# Patient Record
Sex: FEMALE | Race: WHITE | NOT HISPANIC OR LATINO | URBAN - METROPOLITAN AREA
[De-identification: names, ages, dates, MRNs, and addresses within clinical notes are randomized per-mention and may not be internally consistent; named-entity substitution may affect disease eponyms.]

---

## 2017-03-15 ENCOUNTER — OFFICE (AMBULATORY)
Dept: URBAN - METROPOLITAN AREA CLINIC 75 | Facility: CLINIC | Age: 57
End: 2017-03-15
Payer: OTHER GOVERNMENT

## 2017-03-15 VITALS
HEART RATE: 68 BPM | SYSTOLIC BLOOD PRESSURE: 122 MMHG | WEIGHT: 181 LBS | DIASTOLIC BLOOD PRESSURE: 62 MMHG | HEIGHT: 62 IN

## 2017-03-15 DIAGNOSIS — R19.4 CHANGE IN BOWEL HABIT: ICD-10-CM

## 2017-03-15 DIAGNOSIS — R13.10 DYSPHAGIA, UNSPECIFIED: ICD-10-CM

## 2017-03-15 DIAGNOSIS — K21.9 GASTRO-ESOPHAGEAL REFLUX DISEASE WITHOUT ESOPHAGITIS: ICD-10-CM

## 2017-03-15 DIAGNOSIS — Z12.11 ENCOUNTER FOR SCREENING FOR MALIGNANT NEOPLASM OF COLON: ICD-10-CM

## 2017-03-15 DIAGNOSIS — K59.00 CONSTIPATION, UNSPECIFIED: ICD-10-CM

## 2017-03-15 PROCEDURE — 99205 OFFICE O/P NEW HI 60 MIN: CPT | Performed by: INTERNAL MEDICINE

## 2017-04-14 VITALS
DIASTOLIC BLOOD PRESSURE: 53 MMHG | HEART RATE: 72 BPM | HEART RATE: 64 BPM | SYSTOLIC BLOOD PRESSURE: 77 MMHG | TEMPERATURE: 98.1 F | HEART RATE: 70 BPM | RESPIRATION RATE: 4 BRPM | SYSTOLIC BLOOD PRESSURE: 95 MMHG | DIASTOLIC BLOOD PRESSURE: 45 MMHG | SYSTOLIC BLOOD PRESSURE: 101 MMHG | HEART RATE: 66 BPM | TEMPERATURE: 97.9 F | DIASTOLIC BLOOD PRESSURE: 57 MMHG | SYSTOLIC BLOOD PRESSURE: 116 MMHG | DIASTOLIC BLOOD PRESSURE: 32 MMHG | HEART RATE: 63 BPM | DIASTOLIC BLOOD PRESSURE: 60 MMHG | RESPIRATION RATE: 15 BRPM | HEART RATE: 68 BPM | DIASTOLIC BLOOD PRESSURE: 58 MMHG | WEIGHT: 181 LBS | HEART RATE: 62 BPM | OXYGEN SATURATION: 100 % | DIASTOLIC BLOOD PRESSURE: 61 MMHG | HEART RATE: 48 BPM | OXYGEN SATURATION: 95 % | OXYGEN SATURATION: 98 % | SYSTOLIC BLOOD PRESSURE: 108 MMHG | HEIGHT: 62 IN | OXYGEN SATURATION: 99 % | SYSTOLIC BLOOD PRESSURE: 78 MMHG | RESPIRATION RATE: 35 BRPM | DIASTOLIC BLOOD PRESSURE: 65 MMHG | SYSTOLIC BLOOD PRESSURE: 112 MMHG | SYSTOLIC BLOOD PRESSURE: 88 MMHG | RESPIRATION RATE: 16 BRPM | DIASTOLIC BLOOD PRESSURE: 55 MMHG | SYSTOLIC BLOOD PRESSURE: 83 MMHG

## 2017-04-17 ENCOUNTER — OFFICE (AMBULATORY)
Dept: URBAN - METROPOLITAN AREA CLINIC 64 | Facility: CLINIC | Age: 57
End: 2017-04-17
Payer: OTHER GOVERNMENT

## 2017-04-17 ENCOUNTER — AMBULATORY SURGICAL CENTER (AMBULATORY)
Dept: URBAN - METROPOLITAN AREA SURGERY 17 | Facility: SURGERY | Age: 57
End: 2017-04-17
Payer: OTHER GOVERNMENT

## 2017-04-17 DIAGNOSIS — K20.9 ESOPHAGITIS, UNSPECIFIED: ICD-10-CM

## 2017-04-17 DIAGNOSIS — R13.10 DYSPHAGIA, UNSPECIFIED: ICD-10-CM

## 2017-04-17 DIAGNOSIS — K31.9 DISEASE OF STOMACH AND DUODENUM, UNSPECIFIED: ICD-10-CM

## 2017-04-17 DIAGNOSIS — K21.0 GASTRO-ESOPHAGEAL REFLUX DISEASE WITH ESOPHAGITIS: ICD-10-CM

## 2017-04-17 LAB
GI HISTOLOGY: A. UNSPECIFIED: (no result)
GI HISTOLOGY: B. SELECT: (no result)
GI HISTOLOGY: C. SELECT: (no result)
GI HISTOLOGY: PDF REPORT: (no result)

## 2017-04-17 PROCEDURE — 43239 EGD BIOPSY SINGLE/MULTIPLE: CPT | Performed by: INTERNAL MEDICINE

## 2017-04-17 PROCEDURE — 88305 TISSUE EXAM BY PATHOLOGIST: CPT | Performed by: INTERNAL MEDICINE

## 2017-04-17 PROCEDURE — 43450 DILATE ESOPHAGUS 1/MULT PASS: CPT | Performed by: INTERNAL MEDICINE

## 2017-04-17 RX ADMIN — LIDOCAINE HYDROCHLORIDE 25 MG: 10 INJECTION, SOLUTION EPIDURAL; INFILTRATION; INTRACAUDAL; PERINEURAL at 13:02

## 2017-04-17 RX ADMIN — PROPOFOL 50 MG: 10 INJECTION, EMULSION INTRAVENOUS at 13:06

## 2017-04-17 RX ADMIN — PROPOFOL 50 MG: 10 INJECTION, EMULSION INTRAVENOUS at 13:04

## 2017-04-17 RX ADMIN — PROPOFOL 50 MG: 10 INJECTION, EMULSION INTRAVENOUS at 13:08

## 2017-04-17 RX ADMIN — PROPOFOL 100 MG: 10 INJECTION, EMULSION INTRAVENOUS at 13:02

## 2017-04-17 NOTE — SERVICEHPINOTES
Patient here for evaluation of dysphagia. She mentions difficulty swallowing now for the past 15 years. She has difficulty with liquids and solids. She has to perform several maneuvers to help facilitate food passing through her esophagus. She has to chew carefully, small bites. She has long-standing reflux disease. She takes twice-daily PPI therapy. She describes prior upper endoscopy 5 years ago at outside facility. She describes what sounds like esophageal dilation. Prior to this she describes EGD, colonoscopy. Colonoscopy was normal. This was approximately 5-10 years ago.BRShe denies any history of achalasia. No prior esophageal manometry study. There is no family history for colon cancer or other polyp problems. Patient denies any weight loss, rectal bleeding. She does have problems with constipation. Occasionally she will use magnesium citrate. She describes what sounds like prior bladder surgery and rectocele repair. Since that time she's had problems with some constipation.

## 2017-07-17 VITALS
WEIGHT: 175 LBS | DIASTOLIC BLOOD PRESSURE: 70 MMHG | HEART RATE: 64 BPM | HEIGHT: 62 IN | SYSTOLIC BLOOD PRESSURE: 102 MMHG

## 2017-07-18 ENCOUNTER — OFFICE (AMBULATORY)
Dept: URBAN - METROPOLITAN AREA CLINIC 75 | Facility: CLINIC | Age: 57
End: 2017-07-18
Payer: OTHER GOVERNMENT

## 2017-07-18 DIAGNOSIS — R13.10 DYSPHAGIA, UNSPECIFIED: ICD-10-CM

## 2017-07-18 DIAGNOSIS — K21.9 GASTRO-ESOPHAGEAL REFLUX DISEASE WITHOUT ESOPHAGITIS: ICD-10-CM

## 2017-07-18 PROCEDURE — 99213 OFFICE O/P EST LOW 20 MIN: CPT | Performed by: INTERNAL MEDICINE

## 2017-08-17 ENCOUNTER — OFFICE (AMBULATORY)
Dept: URBAN - METROPOLITAN AREA CLINIC 51 | Facility: CLINIC | Age: 57
End: 2017-08-17
Payer: OTHER GOVERNMENT

## 2017-08-17 DIAGNOSIS — K22.4 DYSKINESIA OF ESOPHAGUS: ICD-10-CM

## 2017-08-17 DIAGNOSIS — K21.9 GASTRO-ESOPHAGEAL REFLUX DISEASE WITHOUT ESOPHAGITIS: ICD-10-CM

## 2017-08-17 DIAGNOSIS — R13.10 DYSPHAGIA, UNSPECIFIED: ICD-10-CM

## 2017-08-17 PROCEDURE — 91037 ESOPH IMPED FUNCTION TEST: CPT | Mod: 59 | Performed by: INTERNAL MEDICINE

## 2017-08-17 PROCEDURE — 91010 ESOPHAGUS MOTILITY STUDY: CPT | Performed by: INTERNAL MEDICINE

## 2017-10-05 ENCOUNTER — HOSPITAL ENCOUNTER (OUTPATIENT)
Dept: OTHER | Facility: HOSPITAL | Age: 57
Discharge: HOME | End: 2017-10-05

## 2018-01-17 VITALS
DIASTOLIC BLOOD PRESSURE: 59 MMHG | OXYGEN SATURATION: 91 % | OXYGEN SATURATION: 87 % | HEART RATE: 69 BPM | TEMPERATURE: 98.2 F | DIASTOLIC BLOOD PRESSURE: 60 MMHG | SYSTOLIC BLOOD PRESSURE: 102 MMHG | OXYGEN SATURATION: 89 % | DIASTOLIC BLOOD PRESSURE: 76 MMHG | RESPIRATION RATE: 14 BRPM | DIASTOLIC BLOOD PRESSURE: 68 MMHG | DIASTOLIC BLOOD PRESSURE: 53 MMHG | DIASTOLIC BLOOD PRESSURE: 51 MMHG | RESPIRATION RATE: 15 BRPM | HEART RATE: 59 BPM | HEART RATE: 63 BPM | RESPIRATION RATE: 18 BRPM | HEART RATE: 68 BPM | OXYGEN SATURATION: 98 % | RESPIRATION RATE: 20 BRPM | RESPIRATION RATE: 22 BRPM | OXYGEN SATURATION: 93 % | HEART RATE: 70 BPM | TEMPERATURE: 97.2 F | SYSTOLIC BLOOD PRESSURE: 122 MMHG | HEART RATE: 56 BPM | RESPIRATION RATE: 21 BRPM | SYSTOLIC BLOOD PRESSURE: 101 MMHG | HEIGHT: 62 IN | RESPIRATION RATE: 17 BRPM | SYSTOLIC BLOOD PRESSURE: 126 MMHG | HEART RATE: 65 BPM | SYSTOLIC BLOOD PRESSURE: 118 MMHG | SYSTOLIC BLOOD PRESSURE: 104 MMHG | WEIGHT: 180 LBS | OXYGEN SATURATION: 97 % | OXYGEN SATURATION: 99 % | DIASTOLIC BLOOD PRESSURE: 57 MMHG

## 2018-01-19 ENCOUNTER — AMBULATORY SURGICAL CENTER (AMBULATORY)
Dept: URBAN - METROPOLITAN AREA SURGERY 17 | Facility: SURGERY | Age: 58
End: 2018-01-19
Payer: OTHER GOVERNMENT

## 2018-01-19 DIAGNOSIS — K64.0 FIRST DEGREE HEMORRHOIDS: ICD-10-CM

## 2018-01-19 DIAGNOSIS — Z12.11 ENCOUNTER FOR SCREENING FOR MALIGNANT NEOPLASM OF COLON: ICD-10-CM

## 2018-01-19 PROCEDURE — 45378 DIAGNOSTIC COLONOSCOPY: CPT | Mod: 33 | Performed by: INTERNAL MEDICINE

## 2018-01-19 RX ADMIN — PROPOFOL 25 MG: 10 INJECTION, EMULSION INTRAVENOUS at 14:38

## 2018-01-19 RX ADMIN — LIDOCAINE HYDROCHLORIDE 50 MG: 10 INJECTION, SOLUTION EPIDURAL; INFILTRATION; INTRACAUDAL; PERINEURAL at 14:25

## 2018-01-19 RX ADMIN — PROPOFOL 50 MG: 10 INJECTION, EMULSION INTRAVENOUS at 14:37

## 2018-01-19 RX ADMIN — PROPOFOL 50 MG: 10 INJECTION, EMULSION INTRAVENOUS at 14:29

## 2018-01-19 RX ADMIN — PROPOFOL 100 MG: 10 INJECTION, EMULSION INTRAVENOUS at 14:25

## 2018-01-19 NOTE — SERVICEHPINOTES
KATHRYN HANCOCK  presents for a "bypass" Colonoscopy for polyp surveillance.  She is aware of R/B/A as she has had prior scopes. Anesthesia provider has consented patient also.  Updated NP forms reviewed.

## 2019-03-26 ENCOUNTER — HOSPITAL ENCOUNTER (OUTPATIENT)
Dept: OTHER | Facility: HOSPITAL | Age: 59
Discharge: HOME | End: 2019-03-26

## 2019-07-29 ENCOUNTER — OFFICE VISIT CONVERTED (OUTPATIENT)
Dept: INTERNAL MEDICINE | Facility: CLINIC | Age: 59
End: 2019-07-29
Attending: INTERNAL MEDICINE

## 2019-10-29 ENCOUNTER — OFFICE VISIT CONVERTED (OUTPATIENT)
Dept: INTERNAL MEDICINE | Facility: CLINIC | Age: 59
End: 2019-10-29
Attending: INTERNAL MEDICINE

## 2020-01-13 ENCOUNTER — HOSPITAL ENCOUNTER (OUTPATIENT)
Dept: OTHER | Facility: HOSPITAL | Age: 60
Discharge: HOME | End: 2020-01-13
Attending: PHYSICIAN ASSISTANT

## 2020-01-13 ENCOUNTER — OFFICE VISIT CONVERTED (OUTPATIENT)
Dept: INTERNAL MEDICINE | Facility: CLINIC | Age: 60
End: 2020-01-13
Attending: PHYSICIAN ASSISTANT

## 2020-05-26 ENCOUNTER — TELEMEDICINE CONVERTED (OUTPATIENT)
Dept: INTERNAL MEDICINE | Facility: CLINIC | Age: 60
End: 2020-05-26
Attending: INTERNAL MEDICINE

## 2021-05-13 NOTE — PROGRESS NOTES
Progress Note      Patient Name: Cassia López   Patient ID: [id number]   Sex: Female   YOB: 1960    Primary Care Provider: Andrew Clemens MD    Visit Date: May 26, 2020    Provider: Andrew Clemens MD   Location: [facility]   Location Address: [address] [place] [address], [address]  [place], [place]  [id number]   Location Phone: [phone]          Chief Complaint  · annual exam      History Of Present Illness  Video Conferencing Visit  Cassia López is a 59 year old /White female who is presenting for evaluation via video conferencing via Zoom. Verbal consent obtained before beginning visit.   The following staff were present during this visit: Dr. Clemens and pt   Cassia López is a 59 year old /White female who presents for evaluation and treatment of:      HTN- pt denies HAs, dizziness, CP  hypothyroid- pt reports having blood work in 3/2020.   HLD- pt reports she forgot to take statin, which elevated her cholesterol.   SVT- pt denies palpitations.   depression- doing well on Cymbalta. pt denies HI and SI.   pap- has not had just yet  mammogram- pt is UTD this year       Past Medical History  Disease Name Date Onset Notes   Allergies --  --    Anemia --  --    Broken Bones --  --    Cataracts, bilateral --  --    Depression (emotion) --  --    GERD (gastroesophageal reflux disease) --  --    Head injury --  --    Hyperlipidemia --  --    Hypertension --  --    Hypothyroidism --  --    Night sweats --  --    Screening Mammogram 2019 --    SVT (supraventricular tachycardia) --  --          Past Surgical History  Procedure Name Date Notes   Bowel Surgery --  --    carpal tunnel --  --    Colonoscopy in adult 2017 --    Endoscopy 2017 --    Hernia --  --          Medication List  Name Date Started Instructions   Hector Thyroid 30 mg oral tablet 05/20/2020 take 1 tablet (30 mg) by oral route once daily for 90 days   aspirin 81 mg oral tablet,delayed release (/SHA) 05/20/2020  take 1 tablet (81 mg) by oral route once daily for 90 days   atenolol 25 mg oral tablet 05/20/2020 take 0.5 tablet by oral route 2 times a day for 90 days   Bi-Est 1.25 prog/200GSRCA Oral  Take 1 tablet once daily   Crestor 40 mg oral tablet  take 1 tablet once a daily   Cymbalta 60 mg oral capsule,delayed release(DR/EC) 05/20/2020 take 1 capsule (60 mg) by oral route once daily for 90 days   fluticasone propionate 50 mcg/actuation nasal spray,suspension 01/13/2020 spray 1 - 2 sprays (50 - 100 mcg) in each nostril by intranasal route once daily as needed   lidocaine 5 % topical adhesive patch,medicated 08/13/2019 apply 1 patch by transdermal route once daily (May wear up to 12hours.) PRN   Mobic 15 mg oral tablet 05/20/2020 take 1 tablet (15 mg) by oral route once daily for 90 days   Motrin  mg oral capsule 05/20/2020 take 4 capsules prn for migraines   Pataday 0.2 % ophthalmic (eye) drops  instill 1 drop into both eyes by ophthalmic route once daily   ProAir HFA 90 mcg/actuation inhalation HFA aerosol inhaler  inhale 2 puff (90 mcg) by inhalation route every 6 hours as needed   Protonix 20 mg oral tablet,delayed release (DR/EC) 12/23/2019 take 1 tablet (20 mg) by oral route BID for 90 days   Refresh Optive 0.5-0.9 % ophthalmic (eye) drops  instill 1 drop in affected eye As needed   Singulair 10 mg oral tablet  take 1 tablet (10 mg) by oral route once daily in the evening   Vitamin D3 2,000 unit oral capsule  take 1 capsule by oral route daily   Voltaren 1 % topical gel 08/13/2019 apply 2 gram to the affected area(s) by topical per day PRN   Zofran 8 mg oral tablet  take 1 tablet (8 mg) by oral route PRN         Allergy List  Allergen Name Date Reaction Notes   doxycycline hyclate --  --  --    Percocet --  --  --    SULFA (SULFONAMIDES) --  --  --    tetracycline --  --  --        Allergies Reconciled  Family Medical History  Disease Name Relative/Age Notes   Stroke  --    Heart Disease  --    Cancer,  Unspecified  --    Heart Attack (MI)  --    Diabetes mellitus, type II  --          Social History  Finding Status Start/Stop Quantity Notes   Tobacco Never --/-- --  --          Review of Systems  · Constitutional  o Denies  o : fever, fatigue, weight loss, weight gain  · Cardiovascular  o Denies  o : lower extremity edema, chest pressure, palpitations  · Respiratory  o Denies  o : shortness of breath, wheezing, frequent cough, dyspnea on exertion  · Gastrointestinal  o Denies  o : nausea, vomiting, diarrhea, constipation, abdominal pain  · Psychiatric  o Admits  o : depression  o Denies  o : suicidal ideation, homicidal ideation      Physical Examination     Gen: well-nourished, no acute distress  HENT: atraumatic, normocephalic  Eyes: extraocular movements intact, no scleral icterus  Lung: breathing comfortably, no cough  Neuro: grossly oriented to person, place, and time. no facial droop   Psych: normal mood and affect                 Assessment  · Depression (emotion)     311/F32.9  pt reports doing well on Cymbalta currently.   · Hypertension     401.9/I10  well controlled on current regimen.   · Hyperlipidemia     272.4/E78.5  doing well on statin, obtain labs for review.   · Hypothyroidism     244.9/E03.9  normal on last check per report. cont monitoring.   · SVT (supraventricular tachycardia)     427.89/I47.1  controlled on BB therapy  · Annual physical exam     V70.0/Z00.00    Problems Reconciled  Plan  · Orders  o ACO-39: Current medications updated and reviewed () - - 05/26/2020  · Medications  o Medications have been Reconciled  o Transition of Care or Provider Policy  · Instructions  o Reviewed health maintenance flowsheet and updated information. Orders were placed and/or patient's response was documented.  o Patient was educated/instructed on their diagnosis, treatment and medications prior to discharge from the clinic today.  · Disposition  o f/u in 1 year            Electronically Signed by:  Andrew Clemens MD -Author on May 26, 2020 03:03:05 PM

## 2021-05-15 VITALS
DIASTOLIC BLOOD PRESSURE: 64 MMHG | TEMPERATURE: 97.8 F | RESPIRATION RATE: 16 BRPM | HEIGHT: 62 IN | BODY MASS INDEX: 33.56 KG/M2 | SYSTOLIC BLOOD PRESSURE: 112 MMHG | WEIGHT: 182.37 LBS | OXYGEN SATURATION: 98 % | HEART RATE: 60 BPM

## 2021-05-15 VITALS
OXYGEN SATURATION: 98 % | WEIGHT: 179.5 LBS | BODY MASS INDEX: 33.03 KG/M2 | TEMPERATURE: 97.8 F | DIASTOLIC BLOOD PRESSURE: 68 MMHG | HEART RATE: 75 BPM | SYSTOLIC BLOOD PRESSURE: 118 MMHG | HEIGHT: 62 IN

## 2021-05-15 VITALS
SYSTOLIC BLOOD PRESSURE: 104 MMHG | BODY MASS INDEX: 32.64 KG/M2 | HEIGHT: 62 IN | HEART RATE: 71 BPM | DIASTOLIC BLOOD PRESSURE: 70 MMHG | TEMPERATURE: 97.8 F | OXYGEN SATURATION: 96 % | WEIGHT: 177.37 LBS

## 2021-07-07 ENCOUNTER — OFFICE VISIT (OUTPATIENT)
Dept: INTERNAL MEDICINE | Facility: CLINIC | Age: 61
End: 2021-07-07

## 2021-07-07 VITALS
RESPIRATION RATE: 20 BRPM | TEMPERATURE: 97.2 F | SYSTOLIC BLOOD PRESSURE: 100 MMHG | DIASTOLIC BLOOD PRESSURE: 62 MMHG | BODY MASS INDEX: 33.79 KG/M2 | OXYGEN SATURATION: 98 % | WEIGHT: 179 LBS | HEART RATE: 70 BPM | HEIGHT: 61 IN

## 2021-07-07 DIAGNOSIS — J06.9 ACUTE URI: ICD-10-CM

## 2021-07-07 DIAGNOSIS — R06.02 SHORTNESS OF BREATH: Primary | ICD-10-CM

## 2021-07-07 DIAGNOSIS — R05.9 COUGH: ICD-10-CM

## 2021-07-07 DIAGNOSIS — R10.9 ABDOMINAL PAIN, UNSPECIFIED ABDOMINAL LOCATION: ICD-10-CM

## 2021-07-07 DIAGNOSIS — R19.7 DIARRHEA, UNSPECIFIED TYPE: ICD-10-CM

## 2021-07-07 LAB
ALBUMIN SERPL-MCNC: 4.3 G/DL (ref 3.5–5.2)
ALBUMIN/GLOB SERPL: 1.6 G/DL
ALP SERPL-CCNC: 60 U/L (ref 39–117)
ALT SERPL W P-5'-P-CCNC: 13 U/L (ref 1–33)
ANION GAP SERPL CALCULATED.3IONS-SCNC: 9.8 MMOL/L (ref 5–15)
AST SERPL-CCNC: 19 U/L (ref 1–32)
BASOPHILS # BLD AUTO: 0.03 10*3/MM3 (ref 0–0.2)
BASOPHILS NFR BLD AUTO: 0.3 % (ref 0–1.5)
BILIRUB SERPL-MCNC: 0.3 MG/DL (ref 0–1.2)
BUN SERPL-MCNC: 16 MG/DL (ref 8–23)
BUN/CREAT SERPL: 17.2 (ref 7–25)
CALCIUM SPEC-SCNC: 9.4 MG/DL (ref 8.6–10.5)
CHLORIDE SERPL-SCNC: 105 MMOL/L (ref 98–107)
CO2 SERPL-SCNC: 26.2 MMOL/L (ref 22–29)
CREAT SERPL-MCNC: 0.93 MG/DL (ref 0.57–1)
DEPRECATED RDW RBC AUTO: 39.4 FL (ref 37–54)
EOSINOPHIL # BLD AUTO: 0.22 10*3/MM3 (ref 0–0.4)
EOSINOPHIL NFR BLD AUTO: 1.8 % (ref 0.3–6.2)
ERYTHROCYTE [DISTWIDTH] IN BLOOD BY AUTOMATED COUNT: 12.6 % (ref 12.3–15.4)
GFR SERPL CREATININE-BSD FRML MDRD: 61 ML/MIN/1.73
GLOBULIN UR ELPH-MCNC: 2.7 GM/DL
GLUCOSE SERPL-MCNC: 89 MG/DL (ref 65–99)
HCT VFR BLD AUTO: 43.7 % (ref 34–46.6)
HGB BLD-MCNC: 14.2 G/DL (ref 12–15.9)
IMM GRANULOCYTES # BLD AUTO: 0.04 10*3/MM3 (ref 0–0.05)
IMM GRANULOCYTES NFR BLD AUTO: 0.3 % (ref 0–0.5)
LIPASE SERPL-CCNC: 14 U/L (ref 13–60)
LYMPHOCYTES # BLD AUTO: 3.62 10*3/MM3 (ref 0.7–3.1)
LYMPHOCYTES NFR BLD AUTO: 30.3 % (ref 19.6–45.3)
MCH RBC QN AUTO: 28.2 PG (ref 26.6–33)
MCHC RBC AUTO-ENTMCNC: 32.5 G/DL (ref 31.5–35.7)
MCV RBC AUTO: 86.9 FL (ref 79–97)
MONOCYTES # BLD AUTO: 0.91 10*3/MM3 (ref 0.1–0.9)
MONOCYTES NFR BLD AUTO: 7.6 % (ref 5–12)
NEUTROPHILS NFR BLD AUTO: 59.7 % (ref 42.7–76)
NEUTROPHILS NFR BLD AUTO: 7.11 10*3/MM3 (ref 1.7–7)
NRBC BLD AUTO-RTO: 0 /100 WBC (ref 0–0.2)
PLATELET # BLD AUTO: 237 10*3/MM3 (ref 140–450)
PMV BLD AUTO: 12 FL (ref 6–12)
POTASSIUM SERPL-SCNC: 4.8 MMOL/L (ref 3.5–5.2)
PROT SERPL-MCNC: 7 G/DL (ref 6–8.5)
RBC # BLD AUTO: 5.03 10*6/MM3 (ref 3.77–5.28)
SODIUM SERPL-SCNC: 141 MMOL/L (ref 136–145)
WBC # BLD AUTO: 11.93 10*3/MM3 (ref 3.4–10.8)

## 2021-07-07 PROCEDURE — 80053 COMPREHEN METABOLIC PANEL: CPT | Performed by: PHYSICIAN ASSISTANT

## 2021-07-07 PROCEDURE — 99213 OFFICE O/P EST LOW 20 MIN: CPT | Performed by: PHYSICIAN ASSISTANT

## 2021-07-07 PROCEDURE — 83690 ASSAY OF LIPASE: CPT | Performed by: PHYSICIAN ASSISTANT

## 2021-07-07 PROCEDURE — U0003 INFECTIOUS AGENT DETECTION BY NUCLEIC ACID (DNA OR RNA); SEVERE ACUTE RESPIRATORY SYNDROME CORONAVIRUS 2 (SARS-COV-2) (CORONAVIRUS DISEASE [COVID-19]), AMPLIFIED PROBE TECHNIQUE, MAKING USE OF HIGH THROUGHPUT TECHNOLOGIES AS DESCRIBED BY CMS-2020-01-R: HCPCS | Performed by: PHYSICIAN ASSISTANT

## 2021-07-07 PROCEDURE — 36415 COLL VENOUS BLD VENIPUNCTURE: CPT | Performed by: PHYSICIAN ASSISTANT

## 2021-07-07 PROCEDURE — 85025 COMPLETE CBC W/AUTO DIFF WBC: CPT | Performed by: PHYSICIAN ASSISTANT

## 2021-07-07 RX ORDER — DEXTROMETHORPHAN HYDROBROMIDE AND PROMETHAZINE HYDROCHLORIDE 15; 6.25 MG/5ML; MG/5ML
5 SYRUP ORAL 4 TIMES DAILY PRN
Qty: 240 ML | Refills: 0 | Status: SHIPPED | OUTPATIENT
Start: 2021-07-07 | End: 2021-12-27

## 2021-07-07 RX ORDER — ASPIRIN 81 MG/1
81 TABLET, CHEWABLE ORAL DAILY
COMMUNITY
End: 2021-08-05 | Stop reason: SDUPTHER

## 2021-07-07 RX ORDER — PANTOPRAZOLE SODIUM 40 MG/1
40 TABLET, DELAYED RELEASE ORAL DAILY
COMMUNITY
End: 2021-08-05 | Stop reason: SDUPTHER

## 2021-07-07 RX ORDER — ALBUTEROL SULFATE 90 UG/1
AEROSOL, METERED RESPIRATORY (INHALATION)
COMMUNITY
End: 2021-12-27 | Stop reason: SDUPTHER

## 2021-07-07 RX ORDER — ROSUVASTATIN CALCIUM 40 MG/1
TABLET, COATED ORAL
COMMUNITY
End: 2021-12-27 | Stop reason: SDUPTHER

## 2021-07-07 RX ORDER — ONDANSETRON HYDROCHLORIDE 8 MG/1
TABLET, FILM COATED ORAL
COMMUNITY

## 2021-07-07 RX ORDER — OLOPATADINE HYDROCHLORIDE 2 MG/ML
1 SOLUTION/ DROPS OPHTHALMIC
COMMUNITY
End: 2021-12-27 | Stop reason: SDUPTHER

## 2021-07-07 RX ORDER — FLUTICASONE PROPIONATE 50 MCG
2 SPRAY, SUSPENSION (ML) NASAL DAILY
COMMUNITY
End: 2021-12-27 | Stop reason: SDUPTHER

## 2021-07-07 RX ORDER — DULOXETIN HYDROCHLORIDE 60 MG/1
60 CAPSULE, DELAYED RELEASE ORAL DAILY
COMMUNITY
End: 2021-08-05 | Stop reason: SDUPTHER

## 2021-07-07 RX ORDER — PREDNISONE 20 MG/1
20 TABLET ORAL 2 TIMES DAILY
Qty: 10 TABLET | Refills: 0 | Status: SHIPPED | OUTPATIENT
Start: 2021-07-07 | End: 2021-07-12

## 2021-07-07 RX ORDER — MONTELUKAST SODIUM 10 MG/1
TABLET ORAL
COMMUNITY
End: 2023-01-03 | Stop reason: SDUPTHER

## 2021-07-07 NOTE — PROGRESS NOTES
"Chief Complaint  Cough (chills), Shortness of Breath (nausea), Generalized Body Aches, Acute visit, Headache, and Diarrhea    Subjective          Cassia López presents to [facility]  8-9 days ago started coughing, sinus congestion.  A few days later patient developed chills, shortness of breath, headaches, abdominal pain.  Diarrhea started yesterday.  She has been taking Nyquil, fluconasone, nasal rinse, proair (which is not helping), anti-acid.  SOB has worsened over the past 4 days. March 11th had J&J covid vaccine.        Objective   Vital Signs:   /62 (BP Location: Right arm, Patient Position: Sitting, Cuff Size: Adult)   Pulse 70   Temp 97.2 °F (36.2 °C) (Temporal)   Resp 20   Ht 154.9 cm (61\")   Wt 81.2 kg (179 lb)   SpO2 98% Comment: room air  BMI 33.82 kg/m²     Physical Exam  Vitals reviewed.   Constitutional:       Appearance: Normal appearance. She is well-developed.   HENT:      Head: Normocephalic and atraumatic.      Right Ear: Tympanic membrane, ear canal and external ear normal.      Left Ear: Tympanic membrane, ear canal and external ear normal.      Mouth/Throat:      Pharynx: No oropharyngeal exudate.   Eyes:      Conjunctiva/sclera: Conjunctivae normal.      Pupils: Pupils are equal, round, and reactive to light.   Cardiovascular:      Rate and Rhythm: Normal rate and regular rhythm.      Heart sounds: No murmur heard.   No friction rub. No gallop.    Pulmonary:      Effort: Pulmonary effort is normal.      Breath sounds: No wheezing or rhonchi.      Comments: Breath sounds slightly decreased bilaterally    Abdominal:      General: Bowel sounds are normal. There is no distension.      Palpations: Abdomen is soft.      Tenderness: There is no abdominal tenderness.   Skin:     General: Skin is warm and dry.   Neurological:      Mental Status: She is alert and oriented to person, place, and time.      Cranial Nerves: No cranial nerve " deficit.   Psychiatric:         Mood and Affect: Mood and affect normal.         Behavior: Behavior normal.         Thought Content: Thought content normal.         Judgment: Judgment normal.        Result Review :          Procedures      Assessment and Plan    Diagnoses and all orders for this visit:    1. Shortness of breath (Primary)  Assessment & Plan:  All symptoms most likely viral.  CXR done in office without acute concern.  Due to history of asthma will send in a short course of steroids as I'm concerned patient is not moving air well enough to hear wheezing at this time.  Vital signs stable in office and patient without respiratory distress.  If symptoms persist or worsen, especially if patient develops fever, difficulty breathing or chest pain return or go to the ER.    Orders:  -     XR Chest PA & Lateral (In Office)  -     COVID-19,CEPHEID,COR/DREA/PAD/NATTY IN-HOUSE(OR EMERGENT/ADD-ON),NP SWAB IN TRANSPORT MEDIA 3-4 HR TAT, RT-PCR - Swab, Nasopharynx; Future  -     predniSONE (DELTASONE) 20 MG tablet; Take 1 tablet by mouth 2 (Two) Times a Day for 5 days.  Dispense: 10 tablet; Refill: 0  -     CBC & Differential; Future  -     Comprehensive Metabolic Panel; Future  -     COVID-19,CEPHEID,COR/DREA/PAD/NATTY IN-HOUSE(OR EMERGENT/ADD-ON),NP SWAB IN TRANSPORT MEDIA 3-4 HR TAT, RT-PCR - Swab, Nasopharynx  -     CBC & Differential  -     Comprehensive Metabolic Panel    2. Acute URI    3. Cough  Assessment & Plan:  CXR WNL in office, will send in promethazine-D M for cough.      Orders:  -     COVID-19,CEPHEID,COR/DREA/PAD/NATTY IN-HOUSE(OR EMERGENT/ADD-ON),NP SWAB IN TRANSPORT MEDIA 3-4 HR TAT, RT-PCR - Swab, Nasopharynx; Future  -     predniSONE (DELTASONE) 20 MG tablet; Take 1 tablet by mouth 2 (Two) Times a Day for 5 days.  Dispense: 10 tablet; Refill: 0  -     promethazine-dextromethorphan (PROMETHAZINE-DM) 6.25-15 MG/5ML syrup; Take 5 mL by mouth 4 (Four) Times a Day As Needed for Cough.  Dispense: 240 mL;  Refill: 0  -     COVID-19,CEPHEID,COR/DREA/PAD/NATTY IN-HOUSE(OR EMERGENT/ADD-ON),NP SWAB IN TRANSPORT MEDIA 3-4 HR TAT, RT-PCR - Swab, Nasopharynx    4. Abdominal pain, unspecified abdominal location  Assessment & Plan:  abd pain/diarrhea likely secondary to viral infection and should be self limiting, however, will check basic labs and include lipase.  If symptoms persist or worsen, especially over the next 48 hours patient needs to return.      Orders:  -     Lipase; Future  -     Lipase    5. Diarrhea, unspecified type  -     Lipase; Future  -     Lipase      Follow Up   Return if symptoms worsen or fail to improve.  Patient was given instructions and counseling regarding her condition or for health maintenance advice. Please see specific information pulled into the AVS if appropriate.

## 2021-07-08 LAB — SARS-COV-2 RNA RESP QL NAA+PROBE: NOT DETECTED

## 2021-07-08 NOTE — ASSESSMENT & PLAN NOTE
abd pain/diarrhea likely secondary to viral infection and should be self limiting, however, will check basic labs and include lipase.  If symptoms persist or worsen, especially over the next 48 hours patient needs to return.

## 2021-07-08 NOTE — ASSESSMENT & PLAN NOTE
All symptoms most likely viral.  CXR done in office without acute concern.  Due to history of asthma will send in a short course of steroids as I'm concerned patient is not moving air well enough to hear wheezing at this time.  Vital signs stable in office and patient without respiratory distress.  If symptoms persist or worsen, especially if patient develops fever, difficulty breathing or chest pain return or go to the ER.

## 2021-08-05 RX ORDER — MELOXICAM 15 MG/1
TABLET ORAL
COMMUNITY
Start: 2021-07-05 | End: 2021-08-05 | Stop reason: SDUPTHER

## 2021-08-05 RX ORDER — THYROID 30 MG/1
TABLET ORAL
COMMUNITY
Start: 2021-06-24 | End: 2021-08-05 | Stop reason: SDUPTHER

## 2021-08-05 NOTE — TELEPHONE ENCOUNTER
Caller: Cassia López    Relationship: Self    Best call back number: [phone]     Medication needed:   Requested Prescriptions     Pending Prescriptions Disp Refills   • pantoprazole (PROTONIX) 40 MG EC tablet       Sig: Take 1 tablet by mouth Daily.   • aspirin 81 MG chewable tablet       Sig: Chew 1 tablet Daily.   • DULoxetine (CYMBALTA) 60 MG capsule       Sig: Take 1 capsule by mouth Daily.     ARMOURTHYROID 30 MG &  AMOEBIC ALSO NEEDS TO BE REFILLED  PATIENT WANTS TO BE NOTIFIED WHEN PRESCRIPTIONS HAVE BEEN CALLED  IN    When do you need the refill by: 8/5/21    What additional details did the patient provide when requesting the medication:    Does the patient have less than a 3 day supply:  [x] Yes  [] No    What is the patient's preferred pharmacy: [facility] [place], [place] - [address] [facility] [phone] [facility] [phone]

## 2021-08-09 RX ORDER — PANTOPRAZOLE SODIUM 40 MG/1
40 TABLET, DELAYED RELEASE ORAL DAILY
Qty: 90 TABLET | Refills: 1 | Status: SHIPPED | OUTPATIENT
Start: 2021-08-09 | End: 2021-12-15 | Stop reason: SDUPTHER

## 2021-08-09 RX ORDER — MELOXICAM 15 MG/1
15 TABLET ORAL DAILY
Qty: 90 TABLET | Refills: 1 | Status: SHIPPED | OUTPATIENT
Start: 2021-08-09 | End: 2021-12-27 | Stop reason: SDUPTHER

## 2021-08-09 RX ORDER — THYROID 30 MG/1
30 TABLET ORAL DAILY
Qty: 90 TABLET | Refills: 1 | Status: SHIPPED | OUTPATIENT
Start: 2021-08-09 | End: 2021-12-27 | Stop reason: SDUPTHER

## 2021-08-09 RX ORDER — ASPIRIN 81 MG/1
81 TABLET, CHEWABLE ORAL DAILY
Qty: 90 TABLET | Refills: 3 | Status: SHIPPED | OUTPATIENT
Start: 2021-08-09 | End: 2021-12-27 | Stop reason: SDUPTHER

## 2021-08-09 RX ORDER — DULOXETIN HYDROCHLORIDE 60 MG/1
60 CAPSULE, DELAYED RELEASE ORAL DAILY
Qty: 90 CAPSULE | Refills: 1 | Status: SHIPPED | OUTPATIENT
Start: 2021-08-09 | End: 2021-12-27 | Stop reason: SDUPTHER

## 2021-12-10 NOTE — TELEPHONE ENCOUNTER
Red rule verified and correct.    Pt needing atenolol 25 mg , 1/2 tab bid.    Pantoprazole 40 mg is bid dosing - she is out.    She is changing PCP on 1/20/21. Will need enough med to cover to Appt date.    [facility] pharmacy

## 2021-12-15 RX ORDER — PANTOPRAZOLE SODIUM 40 MG/1
40 TABLET, DELAYED RELEASE ORAL 2 TIMES DAILY
Qty: 30 TABLET | Refills: 1 | Status: SHIPPED | OUTPATIENT
Start: 2021-12-15 | End: 2021-12-27 | Stop reason: SDUPTHER

## 2021-12-15 RX ORDER — ATENOLOL 25 MG/1
12.5 TABLET ORAL 2 TIMES DAILY
Qty: 30 TABLET | Refills: 1 | Status: SHIPPED | OUTPATIENT
Start: 2021-12-15 | End: 2021-12-27 | Stop reason: SDUPTHER

## 2021-12-15 NOTE — TELEPHONE ENCOUNTER
Requested Prescriptions     Signed Prescriptions Disp Refills   • pantoprazole (PROTONIX) 40 MG EC tablet 30 tablet 1     Sig: Take 1 tablet by mouth 2 (Two) Times a Day.     Authorizing Provider: SHAWNA BRAUN     Ordering User: CAROLYN PLAZA   • atenolol (Tenormin) 25 MG tablet 30 tablet 1     Sig: Take 0.5 tablets by mouth 2 (Two) Times a Day.     Authorizing Provider: SHAWNA BRAUN     Ordering User: CAROLYN PLAZA

## 2021-12-23 ENCOUNTER — PATIENT MESSAGE (OUTPATIENT)
Dept: INTERNAL MEDICINE | Facility: CLINIC | Age: 61
End: 2021-12-23

## 2021-12-27 RX ORDER — ATENOLOL 25 MG/1
12.5 TABLET ORAL 2 TIMES DAILY
Qty: 90 TABLET | Refills: 1 | Status: SHIPPED | OUTPATIENT
Start: 2021-12-27 | End: 2022-07-14 | Stop reason: SDUPTHER

## 2021-12-27 RX ORDER — DULOXETIN HYDROCHLORIDE 60 MG/1
60 CAPSULE, DELAYED RELEASE ORAL DAILY
Qty: 90 CAPSULE | Refills: 1 | Status: SHIPPED | OUTPATIENT
Start: 2021-12-27 | End: 2022-07-13 | Stop reason: SDUPTHER

## 2021-12-27 RX ORDER — THYROID 30 MG/1
30 TABLET ORAL DAILY
Qty: 90 TABLET | Refills: 1 | Status: SHIPPED | OUTPATIENT
Start: 2021-12-27 | End: 2022-07-13 | Stop reason: SDUPTHER

## 2021-12-27 RX ORDER — ROSUVASTATIN CALCIUM 40 MG/1
40 TABLET, COATED ORAL NIGHTLY
Qty: 90 TABLET | Refills: 3 | Status: SHIPPED | OUTPATIENT
Start: 2021-12-27 | End: 2023-01-03 | Stop reason: SDUPTHER

## 2021-12-27 RX ORDER — PANTOPRAZOLE SODIUM 40 MG/1
40 TABLET, DELAYED RELEASE ORAL 2 TIMES DAILY
Qty: 180 TABLET | Refills: 1 | Status: SHIPPED | OUTPATIENT
Start: 2021-12-27 | End: 2022-09-29 | Stop reason: SDUPTHER

## 2021-12-27 RX ORDER — OLOPATADINE HYDROCHLORIDE 2 MG/ML
1 SOLUTION/ DROPS OPHTHALMIC DAILY
Qty: 2.5 ML | Refills: 3 | Status: SHIPPED | OUTPATIENT
Start: 2021-12-27

## 2021-12-27 RX ORDER — ALBUTEROL SULFATE 90 UG/1
1 AEROSOL, METERED RESPIRATORY (INHALATION) EVERY 4 HOURS PRN
Qty: 18 G | Refills: 1 | Status: SHIPPED | OUTPATIENT
Start: 2021-12-27 | End: 2023-01-03 | Stop reason: SDUPTHER

## 2021-12-27 RX ORDER — MELOXICAM 15 MG/1
15 TABLET ORAL DAILY
Qty: 90 TABLET | Refills: 1 | Status: SHIPPED | OUTPATIENT
Start: 2021-12-27 | End: 2022-07-13 | Stop reason: SDUPTHER

## 2021-12-27 RX ORDER — ASPIRIN 81 MG/1
81 TABLET, CHEWABLE ORAL DAILY
Qty: 90 TABLET | Refills: 3 | Status: SHIPPED | OUTPATIENT
Start: 2021-12-27 | End: 2023-01-03 | Stop reason: SDUPTHER

## 2021-12-27 RX ORDER — FLUTICASONE PROPIONATE 50 MCG
2 SPRAY, SUSPENSION (ML) NASAL DAILY
Qty: 16 G | Refills: 3 | Status: SHIPPED | OUTPATIENT
Start: 2021-12-27

## 2021-12-27 NOTE — TELEPHONE ENCOUNTER
From: Cassia López  To: Mary Tolbert PA-C  Sent: 2021 11:03 PM EST  Subject: Medications    I have requested my medication be refilled . I was told I cannot get my meds refilled until I have a follow up appointment. I have an appointment . I can not get [facility] to refill my meds including cymbalta thyroid Mobic and atenolol until I am seen as a new patient there. I need these meds refilled. I called an the person told me she would put in for the refills and correction of the pantoprazole from 40 mg once a day to twice a day like it used to be. Please respond it is not advised to stop thyroid medication or Atenolol abruptly. I can’t believe a medical facility would with hold my medications. Dr MUELLER told me I did not really need to be seen every 6 months. I have done as requested and made the appointment. I also [facility]. Can I please get my meds refilled now, By  more will have .

## 2022-01-20 ENCOUNTER — OFFICE VISIT (OUTPATIENT)
Dept: INTERNAL MEDICINE | Facility: CLINIC | Age: 62
End: 2022-01-20

## 2022-01-20 VITALS
TEMPERATURE: 97 F | DIASTOLIC BLOOD PRESSURE: 68 MMHG | HEIGHT: 62 IN | HEART RATE: 70 BPM | BODY MASS INDEX: 31.28 KG/M2 | SYSTOLIC BLOOD PRESSURE: 116 MMHG | OXYGEN SATURATION: 98 % | WEIGHT: 170 LBS

## 2022-01-20 DIAGNOSIS — Z12.4 SCREENING FOR CERVICAL CANCER: ICD-10-CM

## 2022-01-20 DIAGNOSIS — Z00.00 ANNUAL PHYSICAL EXAM: Primary | ICD-10-CM

## 2022-01-20 DIAGNOSIS — E03.9 HYPOTHYROIDISM, UNSPECIFIED TYPE: ICD-10-CM

## 2022-01-20 DIAGNOSIS — I47.1 SVT (SUPRAVENTRICULAR TACHYCARDIA): ICD-10-CM

## 2022-01-20 DIAGNOSIS — I10 PRIMARY HYPERTENSION: ICD-10-CM

## 2022-01-20 DIAGNOSIS — E78.5 HYPERLIPIDEMIA, UNSPECIFIED HYPERLIPIDEMIA TYPE: ICD-10-CM

## 2022-01-20 DIAGNOSIS — Z12.31 BREAST CANCER SCREENING BY MAMMOGRAM: ICD-10-CM

## 2022-01-20 DIAGNOSIS — F32.A DEPRESSION, UNSPECIFIED DEPRESSION TYPE: ICD-10-CM

## 2022-01-20 PROBLEM — K21.9 GERD (GASTROESOPHAGEAL REFLUX DISEASE): Status: ACTIVE | Noted: 2022-01-20

## 2022-01-20 PROBLEM — R61 NIGHT SWEATS: Status: ACTIVE | Noted: 2022-01-20

## 2022-01-20 PROBLEM — I47.10 SVT (SUPRAVENTRICULAR TACHYCARDIA): Status: ACTIVE | Noted: 2022-01-20

## 2022-01-20 PROBLEM — H26.9 CATARACTS, BILATERAL: Status: ACTIVE | Noted: 2022-01-20

## 2022-01-20 PROBLEM — T14.8XXA BROKEN BONES: Status: ACTIVE | Noted: 2022-01-20

## 2022-01-20 PROBLEM — D64.9 ANEMIA: Status: ACTIVE | Noted: 2022-01-20

## 2022-01-20 PROBLEM — S09.90XA HEAD INJURY: Status: ACTIVE | Noted: 2022-01-20

## 2022-01-20 PROBLEM — J01.80 OTHER ACUTE SINUSITIS: Status: ACTIVE | Noted: 2022-01-20

## 2022-01-20 PROCEDURE — 99396 PREV VISIT EST AGE 40-64: CPT | Performed by: INTERNAL MEDICINE

## 2022-01-20 RX ORDER — LIDOCAINE 50 MG/G
PATCH TOPICAL
COMMUNITY
Start: 2010-07-07 | End: 2023-01-03 | Stop reason: SDUPTHER

## 2022-01-20 RX ORDER — LORATADINE 10 MG/1
TABLET ORAL
COMMUNITY
Start: 2021-11-22 | End: 2022-08-09

## 2022-01-20 NOTE — PROGRESS NOTES
Chief Complaint  Annual Exam and Establish Care    Subjective     {Problem List  Visit Diagnosis   Encounters  Notes  Medications  Labs  Result Review Imaging  Media :23}     Cassia López presents to [facility]  History of Present Illness  Previous PCP:  tapan MERLOS   Specialist(s):celestina   COVID vaccine:UTD   Pneumonia vaccine: UTD  Shingles vaccine: UTD  Colon cancer screening: UTD 2016   Mammogram:  2019  Pap Smear:  2015  DEXA/Bone Density: 10 year ago     Labs done in 8/2021. [facility] of [facility].   Hypothyroid- doing well on amour thyroid. Patient reports most recent thyroid normal.   hyperlipidemia- doing well on statin.  Depression- patient reports stable on cymbalta.   hypertension- patient denies Has, dizziness, chest pain. Typically well controlled.   SVT- reported history. On atenolol. Patient doing well. Patient would like to follow-up with cardiology.       Current Outpatient Medications   Medication Instructions   • albuterol sulfate HFA (ProAir HFA) 108 (90 Base) MCG/ACT inhaler 1 puff, Inhalation, Every 4 Hours PRN   • Lead Thyroid 30 mg, Oral, Daily   • aspirin 81 mg, Oral, Daily   • atenolol (TENORMIN) 12.5 mg, Oral, 2 Times Daily   • Carboxymethylcellul-Glycerin 0.5-0.9 % solution 1 drop   • Cholecalciferol 2,000 Units, Oral, Daily   • Diclofenac Sodium (VOLTAREN) 4 g, Topical, 4 Times Daily PRN   • DULoxetine (CYMBALTA) 60 mg, Oral, Daily   • Estriol-Estradiol (BI-EST 50:50 TD) Bi-Est 1.25 prog/200GSRCA Oral Take 1 tablet once daily   Active   • fluticasone (FLONASE) 50 MCG/ACT nasal spray 2 sprays, Nasal, Daily   • lidocaine (LIDODERM) 5 % No dose, route, or frequency recorded.   • loratadine (CLARITIN) 10 MG tablet No dose, route, or frequency recorded.   • meloxicam (MOBIC) 15 mg, Oral, Daily   • montelukast (Singulair) 10 MG tablet Singulair 10 mg oral tablet take 1 tablet (10 mg)  "by oral route once daily in the evening   Active   • olopatadine (Pataday) 0.2 % solution ophthalmic solution 1 drop, Both Eyes, Daily   • ondansetron (Zofran) 8 MG tablet Zofran 8 mg oral tablet take 1 tablet (8 mg) by oral route PRN   Active   • pantoprazole (PROTONIX) 40 mg, Oral, 2 Times Daily   • rosuvastatin (CRESTOR) 40 mg, Oral, Nightly       The following portions of the patient's history were reviewed and updated as appropriate: allergies, current medications, past family history, past medical history, past social history, past surgical history, and problem list.    Objective   Vital Signs:   /68   Pulse 70   Temp 97 °F (36.1 °C) (Temporal)   Ht 156.2 cm (61.5\")   Wt 77.1 kg (170 lb)   SpO2 98%   BMI 31.60 kg/m²     Wt Readings from Last 3 Encounters:   01/25/22 79 kg (174 lb 4 oz)   01/20/22 77.1 kg (170 lb)   07/07/21 81.2 kg (179 lb)     BP Readings from Last 3 Encounters:   01/25/22 115/70   01/20/22 116/68   07/07/21 100/62     Physical Exam   Appearance: No acute distress, well-nourished  Head: normocephalic, atraumatic  Eyes: extraocular movements intact, no scleral icterus, no conjunctival injection  Ears, Nose, and Throat: external ears normal, nares patent, moist mucous membranes  Cardiovascular: regular rate and rhythm. no murmurs, rales, or rhonchi. no edema  Respiratory: breathing comfortably, symmetric chest rise, clear to auscultation bilaterally. No wheezes, rales, or rhonchi.  Neuro: alert and oriented to time, place, and person. Normal gait  Psych: normal mood and affect     Result Review :{Labs  Result Review  Imaging  Med Tab  Media  Procedures :23}   The following data was reviewed by: Andrew Clemens Jr, MD on 01/20/2022:  Common labs    Common Labsle 7/7/21 7/7/21    1401 1401   Glucose  89   BUN  16   Creatinine  0.93   eGFR Non African Am  61   Sodium  141   Potassium  4.8   Chloride  105   Calcium  9.4   Albumin  4.30   Total Bilirubin  0.3   Alkaline " Phosphatase  60   AST (SGOT)  19   ALT (SGPT)  13   WBC 11.93 (A)    Hemoglobin 14.2    Hematocrit 43.7    Platelets 237    (A) Abnormal value              Lab Results   Component Value Date    COVID19 Not Detected 07/07/2021        Assessment and Plan    Diagnoses and all orders for this visit:    1. Annual physical exam (Primary)    2. Hypothyroidism, unspecified type  Comments:  montiro with TSH. stable on current regimen. [facility]    3. SVT (supraventricular tachycardia) (HCC)  Comments:  normal HR today. refer to cardiology per pt request  Orders:  -     Ambulatory Referral to Cardiology    4. Primary hypertension  Comments:  well controlled. cont regimen    5. Hyperlipidemia, unspecified hyperlipidemia type  Comments:  cont statin    6. Depression, unspecified depression type  Comments:  doing well on current dose of cymbalta.     7. Breast cancer screening by mammogram  -     Mammo Screening Bilateral With CAD; Future    8. Screening for cervical cancer  Comments:  will schedule pap      Advised on diet, physical activity, etc    There are no discontinued medications.     Follow Up   Return in 6 months (on 7/20/2022) for Recheck, pap.  Patient was given instructions and counseling regarding her condition or for health maintenance advice. Please see specific information pulled into the AVS if appropriate.

## 2022-01-25 ENCOUNTER — OFFICE VISIT (OUTPATIENT)
Dept: INTERNAL MEDICINE | Facility: CLINIC | Age: 62
End: 2022-01-25

## 2022-01-25 VITALS
HEIGHT: 62 IN | SYSTOLIC BLOOD PRESSURE: 115 MMHG | HEART RATE: 60 BPM | BODY MASS INDEX: 32.07 KG/M2 | WEIGHT: 174.25 LBS | DIASTOLIC BLOOD PRESSURE: 70 MMHG | OXYGEN SATURATION: 96 % | TEMPERATURE: 97 F

## 2022-01-25 DIAGNOSIS — Z12.4 ENCOUNTER FOR PAPANICOLAOU SMEAR FOR CERVICAL CANCER SCREENING: ICD-10-CM

## 2022-01-25 DIAGNOSIS — N94.10 DYSPAREUNIA, FEMALE: Chronic | ICD-10-CM

## 2022-01-25 DIAGNOSIS — Z53.20 MAMMOGRAM DECLINED: ICD-10-CM

## 2022-01-25 DIAGNOSIS — N89.8 VAGINAL DISCHARGE: Primary | ICD-10-CM

## 2022-01-25 LAB
CANDIDA SPECIES: NEGATIVE
GARDNERELLA VAGINALIS: NEGATIVE
T VAGINALIS DNA VAG QL PROBE+SIG AMP: NEGATIVE

## 2022-01-25 PROCEDURE — 99396 PREV VISIT EST AGE 40-64: CPT | Performed by: NURSE PRACTITIONER

## 2022-01-25 PROCEDURE — 87480 CANDIDA DNA DIR PROBE: CPT | Performed by: NURSE PRACTITIONER

## 2022-01-25 PROCEDURE — 87660 TRICHOMONAS VAGIN DIR PROBE: CPT | Performed by: NURSE PRACTITIONER

## 2022-01-25 PROCEDURE — G0123 SCREEN CERV/VAG THIN LAYER: HCPCS | Performed by: NURSE PRACTITIONER

## 2022-01-25 PROCEDURE — 87510 GARDNER VAG DNA DIR PROBE: CPT | Performed by: NURSE PRACTITIONER

## 2022-01-25 NOTE — PROGRESS NOTES
Chief Complaint  Gynecologic Exam    Subjective          Cassia López presents to [facility]  History of Present Illness    Menstrual History  Age of menarche: 13  Cycle length: 28  Flow: (light, medium, heavy): heavy  LMP date: 10/15/2006  Post menopausal?: yes  Age of menopause: around 53    Gynecologic History:  Self breast exams: patient tries to do these  Recent Mammogram: 3-4 years ago  Previous GYN surgery: bladder lift, and ablation   History of infertility: none  Last PAP smear: years ago, has never had an abnormal one  Have you ever had an abnormal PAP?: never    Contraceptive/Sexual history:   Current contraceptive method?: none  Sexually active?: yes  Number of partners?: 1  New partner within the last 3 months?: none  Condom use?: none  History of STDs? none  Painful intercourse?: yes due to dryness      [facility] Declined [facility] at this time.       Current Outpatient Medications   Medication Instructions   • albuterol sulfate HFA (ProAir HFA) 108 (90 Base) MCG/ACT inhaler 1 puff, Inhalation, Every 4 Hours PRN   • [place] Thyroid 30 mg, Oral, Daily   • aspirin 81 mg, Oral, Daily   • atenolol (TENORMIN) 12.5 mg, Oral, 2 Times Daily   • Carboxymethylcellul-Glycerin 0.5-0.9 % solution 1 drop   • Cholecalciferol 2,000 Units, Oral, Daily   • Diclofenac Sodium (VOLTAREN) 4 g, Topical, 4 Times Daily PRN   • DULoxetine (CYMBALTA) 60 mg, Oral, Daily   • Estriol-Estradiol (BI-EST 50:50 TD) Bi-Est 1.25 prog/200GSRCA Oral Take 1 tablet once daily   Active   • fluticasone (FLONASE) 50 MCG/ACT nasal spray 2 sprays, Nasal, Daily   • lidocaine (LIDODERM) 5 % No dose, route, or frequency recorded.   • loratadine (CLARITIN) 10 MG tablet No dose, route, or frequency recorded.   • meloxicam (MOBIC) 15 mg, Oral, Daily   • montelukast (Singulair) 10 MG tablet Singulair 10 mg oral tablet take 1 tablet (10 mg) by oral route once daily in the evening   Active  "  • olopatadine (Pataday) 0.2 % solution ophthalmic solution 1 drop, Both Eyes, Daily   • ondansetron (Zofran) 8 MG tablet Zofran 8 mg oral tablet take 1 tablet (8 mg) by oral route PRN   Active   • pantoprazole (PROTONIX) 40 mg, Oral, 2 Times Daily   • rosuvastatin (CRESTOR) 40 mg, Oral, Nightly       The following portions of the patient's history were reviewed and updated as appropriate: allergies, current medications, past family history, past medical history, past social history, past surgical history, and problem list.    Objective   Vital Signs:   /70   Pulse 60   Temp 97 °F (36.1 °C) (Temporal)   Ht 156.2 cm (61.5\")   Wt 79 kg (174 lb 4 oz)   SpO2 96%   BMI 32.39 kg/m²     Wt Readings from Last 3 Encounters:   01/25/22 79 kg (174 lb 4 oz)   01/20/22 77.1 kg (170 lb)   07/07/21 81.2 kg (179 lb)     BP Readings from Last 3 Encounters:   01/25/22 115/70   01/20/22 116/68   07/07/21 100/62     Physical Exam  Vitals and nursing note reviewed. Exam conducted with a chaperone present (ASIA Dickson).   Constitutional:       Appearance: Normal appearance.   Neck:      Thyroid: No thyroid mass or thyromegaly.   Cardiovascular:      Rate and Rhythm: Normal rate and regular rhythm.      Heart sounds: Normal heart sounds.   Pulmonary:      Effort: Pulmonary effort is normal.      Breath sounds: Normal breath sounds.   Chest:   Breasts:      Right: Normal. No mass, nipple discharge or skin change.      Left: Normal. No mass, nipple discharge or skin change.       Abdominal:      General: Abdomen is flat. Bowel sounds are normal.      Palpations: Abdomen is soft.   Genitourinary:     General: Normal vulva.      Exam position: Lithotomy position.      Vagina: Vaginal discharge (mild white milky discharge ) present.      Cervix: Normal.      Uterus: Normal.       Adnexa: Right adnexa normal and left adnexa normal.   Musculoskeletal:      Cervical back: Full passive range of motion without pain.   Skin:     General: " Skin is warm and dry.      Capillary Refill: Capillary refill takes less than 2 seconds.   Neurological:      General: No focal deficit present.      Mental Status: She is alert and oriented to person, place, and time. Mental status is at baseline.   Psychiatric:         Mood and Affect: Mood normal.         Behavior: Behavior normal.         Thought Content: Thought content normal.         Judgment: Judgment normal.            Result Review :   The following data was reviewed by: KEANU Lino on 01/25/2022:  Common labs    Common Labsle 7/7/21 7/7/21    1401 1401   Glucose  89   BUN  16   Creatinine  0.93   eGFR Non African Am  61   Sodium  141   Potassium  4.8   Chloride  105   Calcium  9.4   Albumin  4.30   Total Bilirubin  0.3   Alkaline Phosphatase  60   AST (SGOT)  19   ALT (SGPT)  13   WBC 11.93 (A)    Hemoglobin 14.2    Hematocrit 43.7    Platelets 237    (A) Abnormal value                   Lab Results   Component Value Date    COVID19 Not Detected 07/07/2021       Procedures        Assessment and Plan    Diagnoses and all orders for this visit:    1. Vaginal discharge (Primary)  -     Gardnerella vaginalis, Trichomonas vaginalis, Candida albicans, DNA - Swab, Vagina    2. Encounter for Papanicolaou smear for cervical cancer screening  -     IGP,rfx Aptima HPV All Pth    3. Mammogram declined  Comments:  [facility]     4. Dyspareunia, female  Comments:  Discussed Astroglide to help with painful intercourse; offered script if this is not helpful           There are no discontinued medications.       Follow Up   Return for Keep follow up with Dr. CASTELLON in July .  Patient was given instructions and counseling regarding her condition or for health maintenance advice. Please see specific information pulled into the AVS if appropriate.

## 2022-01-27 LAB
CONV .: NORMAL
CYTOLOGIST CVX/VAG CYTO: NORMAL
CYTOLOGY CVX/VAG DOC CYTO: NORMAL
CYTOLOGY CVX/VAG DOC THIN PREP: NORMAL
DX ICD CODE: NORMAL
HIV 1 & 2 AB SER-IMP: NORMAL
OTHER STN SPEC: NORMAL
STAT OF ADQ CVX/VAG CYTO-IMP: NORMAL

## 2022-02-16 ENCOUNTER — TELEPHONE (OUTPATIENT)
Dept: INTERNAL MEDICINE | Facility: CLINIC | Age: 62
End: 2022-02-16

## 2022-02-16 NOTE — TELEPHONE ENCOUNTER
ATTEMPTED TO CONTACT PT PER PROVIDER'S INSTRUCTIONS     NO ANSWER     LEFT VOICEMAIL WITH INSTRUCTIONS TO RETURN CALL TO OFFICE AT [phone]    OK FOR HUB TO ADVISE/READ   PAP normal.      Repeat in 3 years.     Pari Gilman, APRN   1/26/2022  9:43 AM EST         Negative vag screen.      Released Not seen Back to Top      Negative vag screen.   PAP normal.      Repeat in 3 years.

## 2022-02-22 ENCOUNTER — OFFICE VISIT (OUTPATIENT)
Dept: CARDIOLOGY | Facility: CLINIC | Age: 62
End: 2022-02-22

## 2022-02-22 VITALS
HEIGHT: 61 IN | BODY MASS INDEX: 31.72 KG/M2 | HEART RATE: 62 BPM | SYSTOLIC BLOOD PRESSURE: 104 MMHG | DIASTOLIC BLOOD PRESSURE: 50 MMHG | WEIGHT: 168 LBS

## 2022-02-22 DIAGNOSIS — E78.2 MIXED HYPERLIPIDEMIA: ICD-10-CM

## 2022-02-22 DIAGNOSIS — I47.1 PSVT (PAROXYSMAL SUPRAVENTRICULAR TACHYCARDIA): Primary | ICD-10-CM

## 2022-02-22 PROCEDURE — 99203 OFFICE O/P NEW LOW 30 MIN: CPT | Performed by: INTERNAL MEDICINE

## 2022-02-22 PROCEDURE — 93000 ELECTROCARDIOGRAM COMPLETE: CPT | Performed by: INTERNAL MEDICINE

## 2022-02-22 NOTE — PROGRESS NOTES
CARDIOLOGY INITIAL CONSULT       Chief Complaint  SVT, Hypertension, and Hyperlipidemia    Subjective            Cassia López presents to [facility] CARDIOLOGY  History of Present Illness    Ms López is here to establish cardiac care.  She has history of proximal supraventricular tachycardia and was previously following up with a cardiologist at [place].  In 2016 she had an episode of heart racing and pounding, associated with dizziness and sweating.  The episode started while she was in a restaurant.  She went home and eventually the symptoms subsided after 1 hour without any treatment.  Next day she had a similar episode of 1 hour duration.  She was seen by PCP and subsequently cardiology, 24 Holter monitor study was done which was unremarkable.  She was put on atenolol for symptomatic management.  A subsequent echocardiogram and stress test were done which were unremarkable.  She is taking atenolol since then.  Currently she has no major symptoms.  She occasionally feels fluttering in the chest but these lasts only for few seconds to less than 1 minute.  There is no associated dizziness or syncopal episode.  She denies having any chest pain or shortness of breath.  No recent cardiac work-up available.       Past History:    Medical History: has a past medical history of Allergies, Anemia, Broken bones, Cataracts, bilateral, Emotional depression, GERD (gastroesophageal reflux disease), Head injury, Hyperlipidemia, Hypertension, Hypothyroidism, Night sweats, and SVT (supraventricular tachycardia) (HCC).     Surgical History: has a past surgical history that includes Bowel resection; Carpal tunnel release; Colonoscopy (2017); Esophagogastroduodenoscopy (2017); and Hernia repair.     Family History: family history includes Cancer in her brother, brother, mother, sister, and sister; Hypertension in her brother, father, mother, sister, and sister.     Social History: reports that she has never  smoked. She has never used smokeless tobacco. She reports current alcohol use of about 1.0 standard drink of alcohol per week. She reports that she does not use drugs.    Allergies: Doxycycline hyclate, Oxycodone-acetaminophen, Sulfa antibiotics, and Tetracycline    Current Outpatient Medications on File Prior to Visit   Medication Sig   • albuterol sulfate HFA (ProAir HFA) 108 (90 Base) MCG/ACT inhaler Inhale 1 puff Every 4 (Four) Hours As Needed for Wheezing or Shortness of Air.   • [place] Thyroid 30 MG tablet Take 1 tablet by mouth Daily.   • aspirin 81 MG chewable tablet Chew 1 tablet Daily.   • atenolol (Tenormin) 25 MG tablet Take 0.5 tablets by mouth 2 (Two) Times a Day.   • Carboxymethylcellul-Glycerin 0.5-0.9 % solution 1 drop.   • Cholecalciferol 50 MCG (2000 UT) capsule Take 1 capsule by mouth Daily.   • Diclofenac Sodium (VOLTAREN) 1 % gel gel Apply 4 g topically to the appropriate area as directed 4 (Four) Times a Day As Needed.   • DULoxetine (CYMBALTA) 60 MG capsule Take 1 capsule by mouth Daily.   • Estriol-Estradiol (BI-EST 50:50 TD) Bi-Est 1.25 prog/200GSRCA Oral Take 1 tablet once daily   Active   • fluticasone (FLONASE) 50 MCG/ACT nasal spray 2 sprays into the nostril(s) as directed by provider Daily.   • lidocaine (LIDODERM) 5 %    • loratadine (CLARITIN) 10 MG tablet    • meloxicam (MOBIC) 15 MG tablet Take 1 tablet by mouth Daily.   • montelukast (Singulair) 10 MG tablet Singulair 10 mg oral tablet take 1 tablet (10 mg) by oral route once daily in the evening   Active   • olopatadine (Pataday) 0.2 % solution ophthalmic solution Administer 1 drop to both eyes Daily.   • ondansetron (Zofran) 8 MG tablet Zofran 8 mg oral tablet take 1 tablet (8 mg) by oral route PRN   Active   • pantoprazole (PROTONIX) 40 MG EC tablet Take 1 tablet by mouth 2 (Two) Times a Day.   • rosuvastatin (Crestor) 40 MG tablet Take 1 tablet by mouth Every Night.     No current facility-administered medications on file  "prior to visit.          Review of Systems   Constitutional: Negative for fatigue, unexpected weight gain and unexpected weight loss.   Eyes: Negative for double vision.   Respiratory: Negative for cough, shortness of breath and wheezing.    Cardiovascular: Positive for palpitations. Negative for chest pain and leg swelling.   Gastrointestinal: Negative for abdominal pain, nausea and vomiting.   Endocrine: Negative for cold intolerance, heat intolerance, polydipsia and polyuria.   Musculoskeletal: Negative for arthralgias and back pain.   Skin: Negative for color change.   Neurological: Negative for dizziness, syncope, weakness and headache.   Hematological: Does not bruise/bleed easily.        Objective     /50   Pulse 62   Ht 154.9 cm (61\")   Wt 76.2 kg (168 lb)   BMI 31.74 kg/m²       Physical Exam  Constitutional:       General: She is awake. She is not in acute distress.     Appearance: Normal appearance.   Eyes:      Extraocular Movements: Extraocular movements intact.      Pupils: Pupils are equal, round, and reactive to light.   Neck:      Thyroid: No thyromegaly.      Vascular: No carotid bruit or JVD.   Cardiovascular:      Rate and Rhythm: Normal rate and regular rhythm.      Chest Wall: PMI is not displaced.      Heart sounds: Normal heart sounds, S1 normal and S2 normal. No murmur heard.  No friction rub. No gallop. No S3 or S4 sounds.    Pulmonary:      Effort: Pulmonary effort is normal. No respiratory distress.      Breath sounds: Normal breath sounds. No wheezing, rhonchi or rales.   Abdominal:      General: Bowel sounds are normal.      Palpations: Abdomen is soft.      Tenderness: There is no abdominal tenderness.   Musculoskeletal:      Cervical back: Neck supple.      Right lower leg: No edema.      Left lower leg: No edema.   Skin:     Nails: There is no clubbing.   Neurological:      General: No focal deficit present.      Mental Status: She is alert and oriented to person, place, " and time.           Result Review :     The following data was reviewed by: Aung Barksdale MD on 02/22/2022:    CMP    CMP 7/7/21   Glucose 89   BUN 16   Creatinine 0.93   eGFR Non African Am 61   Sodium 141   Potassium 4.8   Chloride 105   Calcium 9.4   Albumin 4.30   Total Bilirubin 0.3   Alkaline Phosphatase 60   AST (SGOT) 19   ALT (SGPT) 13           CBC    CBC 7/7/21   WBC 11.93 (A)   RBC 5.03   Hemoglobin 14.2   Hematocrit 43.7   MCV 86.9   MCH 28.2   MCHC 32.5   RDW 12.6   Platelets 237   (A) Abnormal value                   Data reviewed: Cardiology studies              ECG 12 Lead    Date/Time: 2/22/2022 1:36 PM  Performed by: Aung Barksdale MD  Authorized by: Aung Barksdale MD   Comparison: not compared with previous ECG   Previous ECG: no previous ECG available  Rhythm: sinus rhythm  Rate: normal  Conduction: conduction normal  ST Segments: ST segments normal  QRS axis: normal  Other findings comments: Borderline T wave normalities noted in anterior leads  Clinical impression comment: Borderline ECG                  Assessment and Plan        Diagnoses and all orders for this visit:    1. PSVT (paroxysmal supraventricular tachycardia) (HCC) (Primary)  Assessment & Plan:  She had no episodes recently.  Taking atenolol, which will be continued we will proceed with an echocardiogram to assess the LV function and rule out any significant valvular abnormalities.  We will try to get the latest [facility]    Orders:  -     Adult Transthoracic Echo Complete W/ Cont if Necessary Per Protocol; Future  -     ECG 12 Lead    2. Mixed hyperlipidemia  Assessment & Plan:  Lipid control uncertain, [facility].  Continue Crestor.        I spent 25 minutes caring for Cassia on this date of service. This time includes time spent by me in the following activities:reviewing tests, obtaining and/or reviewing a separately obtained history, performing a medically  appropriate examination and/or evaluation , ordering medications, tests, or procedures, and documenting information in the medical record    Follow Up   We will follow echocardiogram reports, otherwise follow-up in 6 months  Return in about 6 months (around 8/22/2022) for Next scheduled follow up, Recheck.    Patient was given instructions and counseling regarding her condition or for health maintenance advice. Please see specific information pulled into the AVS if appropriate.

## 2022-02-22 NOTE — ASSESSMENT & PLAN NOTE
She had no episodes recently.  Taking atenolol, which will be continued we will proceed with an echocardiogram to assess the LV function and rule out any significant valvular abnormalities.  We will try to get the latest thyroid panel reports [facility].

## 2022-03-02 ENCOUNTER — HOSPITAL ENCOUNTER (OUTPATIENT)
Dept: OTHER | Facility: HOSPITAL | Age: 62
Discharge: HOME | End: 2022-03-02

## 2022-03-18 ENCOUNTER — PATIENT MESSAGE (OUTPATIENT)
Dept: INTERNAL MEDICINE | Facility: CLINIC | Age: 62
End: 2022-03-18

## 2022-03-21 NOTE — TELEPHONE ENCOUNTER
From: Cassia López  Sent: 3/20/2022 10:35 AM EDT  To: [facility]  Subject: OVERDUE IMAGING    I had my mammogram at the [facility]. I will bring in records at my appt on 22 nd March.    THANK YOU

## 2022-04-26 ENCOUNTER — TELEPHONE (OUTPATIENT)
Dept: CARDIOLOGY | Facility: CLINIC | Age: 62
End: 2022-04-26

## 2022-04-26 DIAGNOSIS — I47.1 PSVT (PAROXYSMAL SUPRAVENTRICULAR TACHYCARDIA): Primary | ICD-10-CM

## 2022-04-27 NOTE — TELEPHONE ENCOUNTER
Events noted.  She has history of SVT but no documented episodes recently.    As the next step, recommend a 48-hour Holter monitor study to analyze rhythm when the heart rate is high.    Once this is done and if normal, she will be considered for stress testing due to chest pain.  Chest pain is nonexertional, hence less concerning for CAD.  However high heart rates can cause some chest pains too.     Continue atenolol at the current dose while wearing the heart monitor      Electronically signed by Aung Barksdale MD, 04/27/22, 7:29 AM EDT.

## 2022-07-13 RX ORDER — THYROID 30 MG/1
30 TABLET ORAL DAILY
Qty: 90 TABLET | Refills: 1 | Status: SHIPPED | OUTPATIENT
Start: 2022-07-13 | End: 2023-01-03 | Stop reason: SDUPTHER

## 2022-07-13 RX ORDER — MELOXICAM 15 MG/1
15 TABLET ORAL DAILY
Qty: 90 TABLET | Refills: 1 | Status: SHIPPED | OUTPATIENT
Start: 2022-07-13 | End: 2023-01-03 | Stop reason: SDUPTHER

## 2022-07-13 RX ORDER — DULOXETIN HYDROCHLORIDE 60 MG/1
60 CAPSULE, DELAYED RELEASE ORAL DAILY
Qty: 90 CAPSULE | Refills: 1 | Status: SHIPPED | OUTPATIENT
Start: 2022-07-13 | End: 2023-01-03 | Stop reason: SDUPTHER

## 2022-07-13 NOTE — TELEPHONE ENCOUNTER
Caller: Cassia López    Relationship: Self    Best call back number: [phone]    Requested Prescriptions:   Requested Prescriptions     Pending Prescriptions Disp Refills   • meloxicam (MOBIC) 15 MG tablet 90 tablet 1     Sig: Take 1 tablet by mouth Daily.   • DULoxetine (CYMBALTA) 60 MG capsule 90 capsule 1     Sig: Take 1 capsule by mouth Daily.   • [place] Thyroid 30 MG tablet 90 tablet 1     Sig: Take 1 tablet by mouth Daily.   ALSO   MOTRIN 800 MG     Pharmacy where request should be sent:  [facility]    Does the patient have less than a 3 day supply:  [] Yes  [x] No    Pat KENNEDY Rep   07/13/22 09:10 EDT

## 2022-07-13 NOTE — TELEPHONE ENCOUNTER
SNRI Protocol Passed 07/13/2022 09:11 AM   Protocol Details  No active pregnancy on record    No positive pregnancy test in past 12 months    Blood Pressure on record in past 12 months    PHQ-2 or PHQ-9 within last 12 Mo    Recent or Future Visit (12mo/30days)    No BP Higher than 180/110 in past 12 Mo     NSAIDs Protocol Failed 07/13/2022 09:11 AM   Protocol Details  Normal serum potassium in past 12 months    GFR >45 ml/min in past year    HGB greater than 10 or HCT greater than 30 in past 9 months    AST less than 55 or ALT less than 90 in past 9 months    No active pregnancy on record    No positive pregnancy test in past 12 months    Visit with authorizing provider in past 9 months or upcoming 90 days     Thyroid Hormones Protocol Failed 07/13/2022 09:11 AM   Protocol Details  Normal TSH in past 12 months    No active pregnancy on record    No positive pregnancy test in past year    Recent or future visit with authorizing provider     MA UNABLE TO REFILL FAILED PROTOCOLS    PROVIDER PLEASE ADVISE

## 2022-07-14 RX ORDER — ATENOLOL 25 MG/1
12.5 TABLET ORAL 2 TIMES DAILY
Qty: 90 TABLET | Refills: 1 | Status: SHIPPED | OUTPATIENT
Start: 2022-07-14 | End: 2023-01-03 | Stop reason: SDUPTHER

## 2022-07-14 NOTE — TELEPHONE ENCOUNTER
Caller: Cassia López    Relationship: Self    Best call back number: [phone]    Requested Prescriptions:   Requested Prescriptions     Pending Prescriptions Disp Refills   • atenolol (Tenormin) 25 MG tablet 90 tablet 1     Sig: Take 0.5 tablets by mouth 2 (Two) Times a Day.        Pharm[facility] where [facility] be sent[facility] [place]  [place] [facility] [address] [phone] [facility] [phone] FX     Additional details provided by patient: PATIENT IS REQUESTING MORE THAN A MONTH SUPPLY. SHE CURRENTLY HAS 6 PILLS LEFT.     Does the patient have less than a 3 day supply:  [] Yes  [x] No    Pat Guerrero Rep   07/14/22 10:05 EDT

## 2022-07-14 NOTE — TELEPHONE ENCOUNTER
Beta-Blockers Protocol Passed 07/14/2022 10:06 AM   Protocol Details  No active pregnancy on record    BP under 140/90 in past year    No positive pregnancy test in past 12 months    Recent or future visit with authorizing provider

## 2022-08-09 ENCOUNTER — OFFICE VISIT (OUTPATIENT)
Dept: INTERNAL MEDICINE | Facility: CLINIC | Age: 62
End: 2022-08-09

## 2022-08-09 VITALS
HEART RATE: 64 BPM | BODY MASS INDEX: 33.68 KG/M2 | WEIGHT: 178.4 LBS | HEIGHT: 61 IN | TEMPERATURE: 97.3 F | DIASTOLIC BLOOD PRESSURE: 62 MMHG | SYSTOLIC BLOOD PRESSURE: 104 MMHG | OXYGEN SATURATION: 97 %

## 2022-08-09 DIAGNOSIS — E78.5 HYPERLIPIDEMIA, UNSPECIFIED HYPERLIPIDEMIA TYPE: ICD-10-CM

## 2022-08-09 DIAGNOSIS — Z23 NEED FOR TETANUS BOOSTER: ICD-10-CM

## 2022-08-09 DIAGNOSIS — Z11.59 NEED FOR HEPATITIS C SCREENING TEST: Primary | ICD-10-CM

## 2022-08-09 DIAGNOSIS — I47.1 SVT (SUPRAVENTRICULAR TACHYCARDIA): ICD-10-CM

## 2022-08-09 DIAGNOSIS — E03.9 HYPOTHYROIDISM, UNSPECIFIED TYPE: ICD-10-CM

## 2022-08-09 DIAGNOSIS — I10 PRIMARY HYPERTENSION: ICD-10-CM

## 2022-08-09 DIAGNOSIS — F33.42 RECURRENT MAJOR DEPRESSIVE DISORDER, IN FULL REMISSION: ICD-10-CM

## 2022-08-09 LAB
ALBUMIN SERPL-MCNC: 4.3 G/DL (ref 3.5–5.2)
ALBUMIN/GLOB SERPL: 2 G/DL
ALP SERPL-CCNC: 55 U/L (ref 39–117)
ALT SERPL W P-5'-P-CCNC: 18 U/L (ref 1–33)
ANION GAP SERPL CALCULATED.3IONS-SCNC: 8 MMOL/L (ref 5–15)
AST SERPL-CCNC: 19 U/L (ref 1–32)
BASOPHILS # BLD AUTO: 0.02 10*3/MM3 (ref 0–0.2)
BASOPHILS NFR BLD AUTO: 0.2 % (ref 0–1.5)
BILIRUB SERPL-MCNC: 0.3 MG/DL (ref 0–1.2)
BUN SERPL-MCNC: 19 MG/DL (ref 8–23)
BUN/CREAT SERPL: 23.5 (ref 7–25)
CALCIUM SPEC-SCNC: 9.3 MG/DL (ref 8.6–10.5)
CHLORIDE SERPL-SCNC: 105 MMOL/L (ref 98–107)
CHOLEST SERPL-MCNC: 126 MG/DL (ref 0–200)
CO2 SERPL-SCNC: 28 MMOL/L (ref 22–29)
CREAT SERPL-MCNC: 0.81 MG/DL (ref 0.57–1)
DEPRECATED RDW RBC AUTO: 40.2 FL (ref 37–54)
EGFRCR SERPLBLD CKD-EPI 2021: 82.7 ML/MIN/1.73
EOSINOPHIL # BLD AUTO: 0.27 10*3/MM3 (ref 0–0.4)
EOSINOPHIL NFR BLD AUTO: 2.9 % (ref 0.3–6.2)
ERYTHROCYTE [DISTWIDTH] IN BLOOD BY AUTOMATED COUNT: 12.3 % (ref 12.3–15.4)
GLOBULIN UR ELPH-MCNC: 2.1 GM/DL
GLUCOSE SERPL-MCNC: 96 MG/DL (ref 65–99)
HCT VFR BLD AUTO: 40.6 % (ref 34–46.6)
HDLC SERPL-MCNC: 49 MG/DL (ref 40–60)
HGB BLD-MCNC: 13 G/DL (ref 12–15.9)
IMM GRANULOCYTES # BLD AUTO: 0.02 10*3/MM3 (ref 0–0.05)
IMM GRANULOCYTES NFR BLD AUTO: 0.2 % (ref 0–0.5)
LDLC SERPL CALC-MCNC: 47 MG/DL (ref 0–100)
LDLC/HDLC SERPL: 0.83 {RATIO}
LYMPHOCYTES # BLD AUTO: 2.93 10*3/MM3 (ref 0.7–3.1)
LYMPHOCYTES NFR BLD AUTO: 31.7 % (ref 19.6–45.3)
MCH RBC QN AUTO: 28.3 PG (ref 26.6–33)
MCHC RBC AUTO-ENTMCNC: 32 G/DL (ref 31.5–35.7)
MCV RBC AUTO: 88.5 FL (ref 79–97)
MONOCYTES # BLD AUTO: 0.6 10*3/MM3 (ref 0.1–0.9)
MONOCYTES NFR BLD AUTO: 6.5 % (ref 5–12)
NEUTROPHILS NFR BLD AUTO: 5.41 10*3/MM3 (ref 1.7–7)
NEUTROPHILS NFR BLD AUTO: 58.5 % (ref 42.7–76)
NRBC BLD AUTO-RTO: 0 /100 WBC (ref 0–0.2)
PLATELET # BLD AUTO: 192 10*3/MM3 (ref 140–450)
PMV BLD AUTO: 11.3 FL (ref 6–12)
POTASSIUM SERPL-SCNC: 4.6 MMOL/L (ref 3.5–5.2)
PROT SERPL-MCNC: 6.4 G/DL (ref 6–8.5)
RBC # BLD AUTO: 4.59 10*6/MM3 (ref 3.77–5.28)
SODIUM SERPL-SCNC: 141 MMOL/L (ref 136–145)
TRIGL SERPL-MCNC: 181 MG/DL (ref 0–150)
TSH SERPL DL<=0.05 MIU/L-ACNC: 2.03 UIU/ML (ref 0.27–4.2)
VLDLC SERPL-MCNC: 30 MG/DL (ref 5–40)
WBC NRBC COR # BLD: 9.25 10*3/MM3 (ref 3.4–10.8)

## 2022-08-09 PROCEDURE — 84443 ASSAY THYROID STIM HORMONE: CPT | Performed by: INTERNAL MEDICINE

## 2022-08-09 PROCEDURE — 99214 OFFICE O/P EST MOD 30 MIN: CPT | Performed by: INTERNAL MEDICINE

## 2022-08-09 PROCEDURE — 90714 TD VACC NO PRESV 7 YRS+ IM: CPT | Performed by: INTERNAL MEDICINE

## 2022-08-09 PROCEDURE — 90471 IMMUNIZATION ADMIN: CPT | Performed by: INTERNAL MEDICINE

## 2022-08-09 PROCEDURE — 85025 COMPLETE CBC W/AUTO DIFF WBC: CPT | Performed by: INTERNAL MEDICINE

## 2022-08-09 PROCEDURE — 80061 LIPID PANEL: CPT | Performed by: INTERNAL MEDICINE

## 2022-08-09 PROCEDURE — 86803 HEPATITIS C AB TEST: CPT | Performed by: INTERNAL MEDICINE

## 2022-08-09 PROCEDURE — 80053 COMPREHEN METABOLIC PANEL: CPT | Performed by: INTERNAL MEDICINE

## 2022-08-09 RX ORDER — IBUPROFEN 800 MG/1
800 TABLET ORAL EVERY 6 HOURS PRN
Qty: 90 TABLET | Refills: 3 | Status: SHIPPED | OUTPATIENT
Start: 2022-08-09

## 2022-08-09 NOTE — PROGRESS NOTES
Chief Complaint  Immunizations (Needs TD ) and Follow-up    Subjective          Cassia López presents to [facility] INTER[facility] MEDICINE PEDIATRICS  History of Present Illness   MDD- doing well on cymbalta and would like to continue.   Hypothyroid- due for recheck.  Palpitations - helped with atenolol.  hyperlipidemia- doing well on crestor       Current Outpatient Medications   Medication Instructions   • albuterol sulfate HFA (ProAir HFA) 108 (90 Base) MCG/ACT inhaler 1 puff, Inhalation, Every 4 Hours PRN   • [place] Thyroid 30 mg, Oral, Daily   • aspirin 81 mg, Oral, Daily   • atenolol (TENORMIN) 12.5 mg, Oral, 2 Times Daily   • Carboxymethylcellul-Glycerin 0.5-0.9 % solution 1 drop   • Cholecalciferol 2,000 Units, Oral, Daily   • Diclofenac Sodium (VOLTAREN) 4 g, Topical, 4 Times Daily PRN   • DULoxetine (CYMBALTA) 60 mg, Oral, Daily   • Estriol-Estradiol (BI-EST 50:50 TD) Bi-Est 1.25 prog/200GSRCA Oral Take 1 tablet once daily   Active   • fluticasone (FLONASE) 50 MCG/ACT nasal spray 2 sprays, Nasal, Daily   • ibuprofen (ADVIL,MOTRIN) 800 mg, Oral, Every 6 Hours PRN   • lidocaine (LIDODERM) 5 % No dose, route, or frequency recorded.   • meloxicam (MOBIC) 15 mg, Oral, Daily   • montelukast (SINGULAIR) 10 MG tablet Singulair 10 mg oral tablet take 1 tablet (10 mg) by oral route once daily in the evening   Active   • olopatadine (Pataday) 0.2 % solution ophthalmic solution 1 drop, Both Eyes, Daily   • ondansetron (ZOFRAN) 8 MG tablet Zofran 8 mg oral tablet take 1 tablet (8 mg) by oral route PRN   Active   • pantoprazole (PROTONIX) 40 mg, Oral, 2 Times Daily   • rosuvastatin (CRESTOR) 40 mg, Oral, Nightly       The following portions of the patient's history were reviewed and updated as appropriate: allergies, current medications, past family history, past medical history, past social history, past surgical history, and problem list.    Objective   Vital Signs:   /62 (BP Location: Left arm)   " Pulse 64   Temp 97.3 °F (36.3 °C) (Temporal)   Ht 154.9 cm (61\")   Wt 80.9 kg (178 lb 6.4 oz)   SpO2 97%   BMI 33.71 kg/m²     Wt Readings from Last 3 Encounters:   08/09/22 80.9 kg (178 lb 6.4 oz)   02/24/22 76.2 kg (168 lb)   02/22/22 76.2 kg (168 lb)     BP Readings from Last 3 Encounters:   08/09/22 104/62   02/22/22 104/50   01/25/22 115/70     Physical Exam   Appearance: No acute distress, well-nourished  Head: normocephalic, atraumatic  Eyes: extraocular movements intact, no scleral icterus, no conjunctival injection  Ears, Nose, and Throat: external ears normal, nares patent, moist mucous membranes  Cardiovascular: regular rate and rhythm. no murmurs, rubs, or gallops. no edema  Respiratory: breathing comfortably, symmetric chest rise, clear to auscultation bilaterally. No wheezes, rales, or rhonchi.  Neuro: alert and oriented to time, place, and person. Normal gait  Psych: normal mood and affect        Assessment and Plan {CC Problem List  Visit Diagnosis   ROS  Review (Popup)  Health Maintenance  Quality  BestPractice  Medications  SmartSets  SnapShot Encounters  Media :23}   Diagnoses and all orders for this visit:    1. Need for hepatitis C screening test (Primary)  -     HCV Antibody Rfx To Qnt PCR    2. Need for tetanus booster  -     Td Vaccine Greater Than or Equal To 8yo With Preservative IM    3. Primary hypertension  Comments:  well controlled on regimen  Orders:  -     Comprehensive Metabolic Panel  -     CBC & Differential    4. Hyperlipidemia, unspecified hyperlipidemia type  Comments:  cont statin. check labs.   Orders:  -     Lipid Panel  -     Comprehensive Metabolic Panel    5. Hypothyroidism, unspecified type  Comments:  check TSH and adjust synthroid accordingly  Orders:  -     TSH    6. Recurrent major depressive disorder, in full remission (HCC)  Comments:  doing well on cymbalta and will cont.     7. SVT (supraventricular tachycardia) (HCC)  Comments:  cardilogy " notes reviewed. echo and event monitor reviewed. controlled with atenolol.     Other orders  -     ibuprofen (ADVIL,MOTRIN) 800 MG tablet; Take 1 tablet by mouth Every 6 (Six) Hours As Needed for Headache.  Dispense: 90 tablet; Refill: 3        Medications Discontinued During This Encounter   Medication Reason   • loratadine (CLARITIN) 10 MG tablet *Therapy completed        Follow Up   Return in about 6 months (around 2/9/2023) for Recheck.  Patient was given instructions and counseling regarding her condition or for health maintenance advice. Please see specific information pulled into the AVS if appropriate.       Andrew Clemens Jr, MD  08/09/22  16:44 EDT

## 2022-08-10 LAB
ARTICHOKE IGE QN: 71 MG/DL (ref 0–100)
CREATININE: 0.86
HBA1C MFR BLD: 5.4 % (ref 4.8–5.6)
HGB BLD-MCNC: 12.3 G/DL
Lab: 0.88
VITAMIN D 25: 26.3

## 2022-08-11 LAB
HCV AB S/CO SERPL IA: 0.1 S/CO RATIO (ref 0–0.9)
HCV AB SERPL QL IA: NORMAL

## 2022-09-19 ENCOUNTER — OFFICE VISIT (OUTPATIENT)
Dept: CARDIOLOGY | Facility: CLINIC | Age: 62
End: 2022-09-19

## 2022-09-19 VITALS
SYSTOLIC BLOOD PRESSURE: 95 MMHG | BODY MASS INDEX: 32.66 KG/M2 | HEIGHT: 61 IN | DIASTOLIC BLOOD PRESSURE: 65 MMHG | WEIGHT: 173 LBS | HEART RATE: 63 BPM

## 2022-09-19 DIAGNOSIS — E78.2 MIXED HYPERLIPIDEMIA: ICD-10-CM

## 2022-09-19 DIAGNOSIS — I47.1 PSVT (PAROXYSMAL SUPRAVENTRICULAR TACHYCARDIA): Primary | ICD-10-CM

## 2022-09-19 PROCEDURE — 99213 OFFICE O/P EST LOW 20 MIN: CPT | Performed by: INTERNAL MEDICINE

## 2022-09-19 RX ORDER — PHENTERMINE HYDROCHLORIDE 37.5 MG/1
37.5 TABLET ORAL 2 TIMES DAILY
COMMUNITY
Start: 2022-08-29

## 2022-09-19 NOTE — ASSESSMENT & PLAN NOTE
No recent episodes.  42 Holter monitor study showed isolated ectopic beats with no arrhythmias.  Recommend to continue atenolol at the current dose.  Also noted that, blood pressure is on the lower side.  Encouraged to increase hydration and keep home blood pressure log.  We will continue current dose of atenolol for now.

## 2022-09-19 NOTE — PROGRESS NOTES
CARDIOLOGY FOLLOW-UP PROGRESS NOTE        Chief Complaint  Hyperlipidemia and Palpitations (Follow up )    Subjective            Cassia López presents to [facility] CARDIOLOGY  History of Present Illness      Ms. López is here for routine 6-month follow-up visit.  She has history of paroxysmal supraventricular tachycardia.  She was previously seen in February when she came to establish cardiac care.  Since then echocardiogram showed normal LV function.  Holter monitor study showed isolated ectopic beats and significant amount of time spent in bradycardia.  Today she reports feeling fine.  She gets a random intermittent palpitations which are short lasting.  Denies any dizziness.  Denies any chest pain.  No recent hospitalizations or ER visits.       Past History:    Medical History:  Past Medical History:   Diagnosis Date   • Allergies    • Anemia    • Broken bones    • Cataracts, bilateral    • Emotional depression    • GERD (gastroesophageal reflux disease)    • Head injury    • Hyperlipidemia    • Hypertension    • Hypothyroidism    • Night sweats    • SVT (supraventricular tachycardia) (HCC)        Surgical History: has a past surgical history that includes Bowel resection; Carpal tunnel release; Colonoscopy (2017); Esophagogastroduodenoscopy (2017); and Hernia repair.     Family History: family history includes Cancer in her brother, brother, mother, sister, and sister; Hypertension in her brother, father, mother, sister, and sister.     Social History: reports that she has never smoked. She has never used smokeless tobacco. She reports current alcohol use of about 1.0 standard drink of alcohol per week. She reports that she does not use drugs.    Allergies: Doxycycline hyclate, Oxycodone-acetaminophen, Sulfa antibiotics, and Tetracycline    Current Outpatient Medications on File Prior to Visit   Medication Sig   • albuterol sulfate HFA (ProAir HFA) 108 (90 Base) MCG/ACT inhaler Inhale 1  puff Every 4 (Four) Hours As Needed for Wheezing or Shortness of Air.   • [place] Thyroid 30 MG tablet Take 1 tablet by mouth Daily.   • aspirin 81 MG chewable tablet Chew 1 tablet Daily.   • atenolol (Tenormin) 25 MG tablet Take 0.5 tablets by mouth 2 (Two) Times a Day.   • Carboxymethylcellul-Glycerin 0.5-0.9 % solution 1 drop.   • Cholecalciferol 50 MCG (2000 UT) capsule Take 1 capsule by mouth Daily.   • Diclofenac Sodium (VOLTAREN) 1 % gel gel Apply 4 g topically to the appropriate area as directed 4 (Four) Times a Day As Needed.   • DULoxetine (CYMBALTA) 60 MG capsule Take 1 capsule by mouth Daily.   • Estriol-Estradiol (BI-EST 50:50 TD) Bi-Est 1.25 prog/200GSRCA Oral Take 1 tablet once daily   Active   • fluticasone (FLONASE) 50 MCG/ACT nasal spray 2 sprays into the nostril(s) as directed by provider Daily.   • ibuprofen (ADVIL,MOTRIN) 800 MG tablet Take 1 tablet by mouth Every 6 (Six) Hours As Needed for Headache.   • lidocaine (LIDODERM) 5 %    • meloxicam (MOBIC) 15 MG tablet Take 1 tablet by mouth Daily.   • montelukast (SINGULAIR) 10 MG tablet Singulair 10 mg oral tablet take 1 tablet (10 mg) by oral route once daily in the evening   Active   • olopatadine (Pataday) 0.2 % solution ophthalmic solution Administer 1 drop to both eyes Daily.   • ondansetron (ZOFRAN) 8 MG tablet Zofran 8 mg oral tablet take 1 tablet (8 mg) by oral route PRN   Active   • pantoprazole (PROTONIX) 40 MG EC tablet Take 1 tablet by mouth 2 (Two) Times a Day.   • phentermine (ADIPEX-P) 37.5 MG tablet Take 37.5 mg by mouth 2 (Two) Times a Day.   • rosuvastatin (Crestor) 40 MG tablet Take 1 tablet by mouth Every Night.     No current facility-administered medications on file prior to visit.          Review of Systems   Respiratory: Negative for cough, shortness of breath and wheezing.    Cardiovascular: Positive for palpitations (Random episodes). Negative for chest pain and leg swelling.   Gastrointestinal: Negative for nausea and  "vomiting.   Neurological: Negative for dizziness and syncope.        Objective     BP 95/65   Pulse 63   Ht 154.9 cm (61\")   Wt 78.5 kg (173 lb)   BMI 32.69 kg/m²       Physical Exam    General : Alert, awake, no acute distress  Neck : Supple, no carotid bruit, no jugular venous distention  CVS : Regular rate and rhythm, no murmur, rubs or gallops  Lungs: Clear to auscultation bilaterally, no crackles or rhonchi  Abdomen: Soft, nontender, bowel sounds heard in all 4 quadrants  Extremities: Warm, well-perfused, no pedal edema    Result Review :     The following data was reviewed by: Aung Barksdale MD on 09/19/2022:    CMP    CMP 8/9/22   Glucose 96   BUN 19   Creatinine 0.81   Sodium 141   Potassium 4.6   Chloride 105   Calcium 9.3   Albumin 4.30   Total Bilirubin 0.3   Alkaline Phosphatase 55   AST (SGOT) 19   ALT (SGPT) 18           CBC    CBC 3/22/22 8/9/22   WBC  9.25   RBC  4.59   Hemoglobin 12.3 13.0   Hematocrit  40.6   MCV  88.5   MCH  28.3   MCHC  32.0   RDW  12.3   Platelets  192           TSH    TSH 8/9/22   TSH 2.030           Lipid Panel    Lipid Panel 3/22/22 8/9/22   Total Cholesterol  126   Triglycerides  181 (A)   HDL Cholesterol  49   VLDL Cholesterol  30   LDL Cholesterol  71 47   LDL/HDL Ratio  0.83   (A) Abnormal value                 Data reviewed: Cardiology studies        Results for orders placed in visit on 02/24/22    Adult Transthoracic Echo Complete W/ Cont if Necessary Per Protocol    Interpretation Summary  · Left ventricular ejection fraction appears to be 61 - 65%. Left ventricular systolic function is normal.  · Left ventricular diastolic function was normal.  · There are no hemodynamically significant valvular abnormalities.  · Estimated right ventricular systolic pressure from tricuspid regurgitation is normal (<35 mmHg).    48 Holter monitor study done on 5/3/2022 showed    · A relatively benign 48-hour Holter monitor study.  · Underlying rhythm is normal sinus rhythm with " an average heart rate of 64 bpm. 32% of the time was spent in bradycardia  · Infrequent atrial premature complexes noted. There was a 4 beat run of supraventricular ectopy  · There were no sustained atrial or ventricular arrhythmias.                 Assessment and Plan        Diagnoses and all orders for this visit:    1. PSVT (paroxysmal supraventricular tachycardia) (HCC) (Primary)  Assessment & Plan:  No recent episodes.  42 Holter monitor study showed isolated ectopic beats with no arrhythmias.  Recommend to continue atenolol at the current dose.  Also noted that, blood pressure is on the lower side.  Encouraged to increase hydration and keep home blood pressure log.  We will continue current dose of atenolol for now.       2. Mixed hyperlipidemia  Assessment & Plan:  Most recent LDL is 46, which is at goal.  Continue rosuvastatin at current dose.              Follow Up     Return in about 1 year (around 9/19/2023) for Next scheduled follow up.    Patient was given instructions and counseling regarding her condition or for health maintenance advice. Please see specific information pulled into the AVS if appropriate.

## 2022-09-29 RX ORDER — PANTOPRAZOLE SODIUM 40 MG/1
40 TABLET, DELAYED RELEASE ORAL 2 TIMES DAILY
Qty: 180 TABLET | Refills: 1 | Status: SHIPPED | OUTPATIENT
Start: 2022-09-29

## 2022-10-04 ENCOUNTER — TELEPHONE (OUTPATIENT)
Dept: INTERNAL MEDICINE | Facility: CLINIC | Age: 62
End: 2022-10-04

## 2022-10-04 NOTE — TELEPHONE ENCOUNTER
PATIENT CALLED AND VM LEFT TO CALL THE OFFICE AND RESCHEDULE APPT.  PROVIDER WILL BE OUT OF THE OFFICE TIL 3/9/22.    HUB OKAY TO RESCHEDULE APPT FIRST AVAILABLE

## 2023-01-03 RX ORDER — DULOXETIN HYDROCHLORIDE 60 MG/1
60 CAPSULE, DELAYED RELEASE ORAL DAILY
Qty: 90 CAPSULE | Refills: 1 | Status: SHIPPED | OUTPATIENT
Start: 2023-01-03

## 2023-01-03 RX ORDER — ATENOLOL 25 MG/1
12.5 TABLET ORAL 2 TIMES DAILY
Qty: 90 TABLET | Refills: 1 | Status: SHIPPED | OUTPATIENT
Start: 2023-01-03

## 2023-01-03 RX ORDER — ROSUVASTATIN CALCIUM 40 MG/1
40 TABLET, COATED ORAL NIGHTLY
Qty: 90 TABLET | Refills: 3 | Status: SHIPPED | OUTPATIENT
Start: 2023-01-03

## 2023-01-03 RX ORDER — ALBUTEROL SULFATE 90 UG/1
1 AEROSOL, METERED RESPIRATORY (INHALATION) EVERY 4 HOURS PRN
Qty: 18 G | Refills: 1 | Status: SHIPPED | OUTPATIENT
Start: 2023-01-03

## 2023-01-03 RX ORDER — ASPIRIN 81 MG/1
81 TABLET, CHEWABLE ORAL DAILY
Qty: 90 TABLET | Refills: 3 | Status: SHIPPED | OUTPATIENT
Start: 2023-01-03

## 2023-01-03 RX ORDER — THYROID 30 MG/1
30 TABLET ORAL DAILY
Qty: 90 TABLET | Refills: 1 | Status: SHIPPED | OUTPATIENT
Start: 2023-01-03

## 2023-01-03 RX ORDER — MELOXICAM 15 MG/1
15 TABLET ORAL DAILY
Qty: 90 TABLET | Refills: 1 | Status: SHIPPED | OUTPATIENT
Start: 2023-01-03

## 2023-01-03 NOTE — TELEPHONE ENCOUNTER
Lidocaine patches, diclofenac and Singulair all ordered by Historical Provider 1 year ago. Please advise.

## 2023-01-04 RX ORDER — MONTELUKAST SODIUM 10 MG/1
10 TABLET ORAL NIGHTLY
Qty: 90 TABLET | Refills: 3 | Status: SHIPPED | OUTPATIENT
Start: 2023-01-04

## 2023-01-04 RX ORDER — LIDOCAINE 50 MG/G
1 PATCH TOPICAL EVERY 24 HOURS
Qty: 90 EACH | Refills: 3 | Status: SHIPPED | OUTPATIENT
Start: 2023-01-04

## 2023-02-28 ENCOUNTER — HOSPITAL ENCOUNTER (EMERGENCY)
Facility: HOSPITAL | Age: 63
Discharge: HOME | End: 2023-02-28
Attending: EMERGENCY MEDICINE | Admitting: EMERGENCY MEDICINE
Payer: OTHER GOVERNMENT

## 2023-02-28 ENCOUNTER — APPOINTMENT (OUTPATIENT)
Dept: GENERAL RADIOLOGY | Facility: HOSPITAL | Age: 63
End: 2023-02-28
Payer: OTHER GOVERNMENT

## 2023-02-28 VITALS
TEMPERATURE: 98.3 F | BODY MASS INDEX: 32.42 KG/M2 | OXYGEN SATURATION: 97 % | DIASTOLIC BLOOD PRESSURE: 66 MMHG | HEIGHT: 62 IN | RESPIRATION RATE: 18 BRPM | SYSTOLIC BLOOD PRESSURE: 100 MMHG | HEART RATE: 68 BPM | WEIGHT: 176.15 LBS

## 2023-02-28 DIAGNOSIS — M79.605 LEFT LEG PAIN: Primary | ICD-10-CM

## 2023-02-28 LAB
ANION GAP SERPL CALCULATED.3IONS-SCNC: 8 MMOL/L (ref 5–15)
BASOPHILS # BLD AUTO: 0.03 10*3/MM3 (ref 0–0.2)
BASOPHILS NFR BLD AUTO: 0.4 % (ref 0–1.5)
BUN SERPL-MCNC: 24 MG/DL (ref 8–23)
BUN/CREAT SERPL: 24 (ref 7–25)
CALCIUM SPEC-SCNC: 8.8 MG/DL (ref 8.6–10.5)
CHLORIDE SERPL-SCNC: 108 MMOL/L (ref 98–107)
CO2 SERPL-SCNC: 27 MMOL/L (ref 22–29)
CREAT SERPL-MCNC: 1 MG/DL (ref 0.57–1)
D DIMER PPP FEU-MCNC: 0.33 MCGFEU/ML (ref 0–0.62)
DEPRECATED RDW RBC AUTO: 42.2 FL (ref 37–54)
EGFRCR SERPLBLD CKD-EPI 2021: 63.8 ML/MIN/1.73
EOSINOPHIL # BLD AUTO: 0.3 10*3/MM3 (ref 0–0.4)
EOSINOPHIL NFR BLD AUTO: 3.7 % (ref 0.3–6.2)
ERYTHROCYTE [DISTWIDTH] IN BLOOD BY AUTOMATED COUNT: 13 % (ref 12.3–15.4)
GLUCOSE SERPL-MCNC: 92 MG/DL (ref 65–99)
HCT VFR BLD AUTO: 37.7 % (ref 34–46.6)
HGB BLD-MCNC: 12.3 G/DL (ref 12–15.9)
IMM GRANULOCYTES # BLD AUTO: 0.02 10*3/MM3 (ref 0–0.05)
IMM GRANULOCYTES NFR BLD AUTO: 0.2 % (ref 0–0.5)
LYMPHOCYTES # BLD AUTO: 2.4 10*3/MM3 (ref 0.7–3.1)
LYMPHOCYTES NFR BLD AUTO: 29.6 % (ref 19.6–45.3)
MCH RBC QN AUTO: 28.8 PG (ref 26.6–33)
MCHC RBC AUTO-ENTMCNC: 32.6 G/DL (ref 31.5–35.7)
MCV RBC AUTO: 88.3 FL (ref 79–97)
MONOCYTES # BLD AUTO: 0.91 10*3/MM3 (ref 0.1–0.9)
MONOCYTES NFR BLD AUTO: 11.2 % (ref 5–12)
NEUTROPHILS NFR BLD AUTO: 4.44 10*3/MM3 (ref 1.7–7)
NEUTROPHILS NFR BLD AUTO: 54.9 % (ref 42.7–76)
NRBC BLD AUTO-RTO: 0 /100 WBC (ref 0–0.2)
PLATELET # BLD AUTO: 170 10*3/MM3 (ref 140–450)
PMV BLD AUTO: 10.9 FL (ref 6–12)
POTASSIUM SERPL-SCNC: 4.1 MMOL/L (ref 3.5–5.2)
RBC # BLD AUTO: 4.27 10*6/MM3 (ref 3.77–5.28)
SODIUM SERPL-SCNC: 143 MMOL/L (ref 136–145)
WBC NRBC COR # BLD: 8.1 10*3/MM3 (ref 3.4–10.8)

## 2023-02-28 PROCEDURE — 85025 COMPLETE CBC W/AUTO DIFF WBC: CPT | Performed by: NURSE PRACTITIONER

## 2023-02-28 PROCEDURE — 25010000002 KETOROLAC TROMETHAMINE PER 15 MG: Performed by: NURSE PRACTITIONER

## 2023-02-28 PROCEDURE — 85379 FIBRIN DEGRADATION QUANT: CPT | Performed by: NURSE PRACTITIONER

## 2023-02-28 PROCEDURE — 73590 X-RAY EXAM OF LOWER LEG: CPT

## 2023-02-28 PROCEDURE — 96372 THER/PROPH/DIAG INJ SC/IM: CPT

## 2023-02-28 PROCEDURE — 99283 EMERGENCY DEPT VISIT LOW MDM: CPT

## 2023-02-28 PROCEDURE — 80048 BASIC METABOLIC PNL TOTAL CA: CPT | Performed by: NURSE PRACTITIONER

## 2023-02-28 RX ORDER — KETOROLAC TROMETHAMINE 30 MG/ML
30 INJECTION, SOLUTION INTRAMUSCULAR; INTRAVENOUS ONCE
Status: COMPLETED | OUTPATIENT
Start: 2023-02-28 | End: 2023-02-28

## 2023-02-28 RX ADMIN — KETOROLAC TROMETHAMINE 30 MG: 30 INJECTION, SOLUTION INTRAMUSCULAR; INTRAVENOUS at 04:10

## 2023-03-13 ENCOUNTER — OFFICE VISIT (OUTPATIENT)
Dept: INTERNAL MEDICINE | Facility: CLINIC | Age: 63
End: 2023-03-13
Payer: OTHER GOVERNMENT

## 2023-03-13 VITALS
SYSTOLIC BLOOD PRESSURE: 117 MMHG | HEIGHT: 62 IN | WEIGHT: 172.6 LBS | TEMPERATURE: 97.5 F | HEART RATE: 61 BPM | BODY MASS INDEX: 31.76 KG/M2 | OXYGEN SATURATION: 96 % | DIASTOLIC BLOOD PRESSURE: 70 MMHG

## 2023-03-13 DIAGNOSIS — Z12.31 BREAST CANCER SCREENING BY MAMMOGRAM: ICD-10-CM

## 2023-03-13 DIAGNOSIS — I47.1 PSVT (PAROXYSMAL SUPRAVENTRICULAR TACHYCARDIA): ICD-10-CM

## 2023-03-13 DIAGNOSIS — E78.2 MIXED HYPERLIPIDEMIA: ICD-10-CM

## 2023-03-13 DIAGNOSIS — I10 PRIMARY HYPERTENSION: ICD-10-CM

## 2023-03-13 DIAGNOSIS — H53.9 VISION CHANGES: ICD-10-CM

## 2023-03-13 DIAGNOSIS — Z00.00 ANNUAL PHYSICAL EXAM: Primary | ICD-10-CM

## 2023-03-13 DIAGNOSIS — E03.9 ACQUIRED HYPOTHYROIDISM: ICD-10-CM

## 2023-03-13 PROCEDURE — 99396 PREV VISIT EST AGE 40-64: CPT | Performed by: INTERNAL MEDICINE

## 2023-03-13 NOTE — PROGRESS NOTES
Chief Complaint  Annual Exam    Subjective          Cassia López presents to [facility]  History of Present Illness  hypertension- patient denies Has, dizziness, chest pain.   hyperlipidemia- doing well on statin. Due for recheck.   Hypothyroid- due for recheck.  Patient reports having episode of vision changes. Has not seen optho in 3-4 years. She does not think lens prescription is still accurate. Patient has history of cataracts.   [facility]     Current Outpatient Medications   Medication Instructions   • albuterol sulfate HFA (ProAir HFA) 108 (90 Base) MCG/ACT inhaler 1 puff, Inhalation, Every 4 Hours PRN   • [place] Thyroid 30 mg, Oral, Daily   • aspirin 81 mg, Oral, Daily   • atenolol (TENORMIN) 12.5 mg, Oral, 2 Times Daily   • Carboxymethylcellul-Glycerin 0.5-0.9 % solution 1 drop   • Cholecalciferol 2,000 Units, Oral, Daily   • Diclofenac Sodium (VOLTAREN) 4 g, Topical, 4 Times Daily PRN   • DULoxetine (CYMBALTA) 60 mg, Oral, Daily   • Estriol-Estradiol (BI-EST 50:50 TD) Bi-Est 1.25 prog/200GSRCA Oral Take 1 tablet once daily   Active   • fluticasone (FLONASE) 50 MCG/ACT nasal spray 2 sprays, Nasal, Daily   • ibuprofen (ADVIL,MOTRIN) 800 mg, Oral, Every 6 Hours PRN   • lidocaine (LIDODERM) 5 % 1 patch, Transdermal, Every 24 Hours   • meloxicam (MOBIC) 15 mg, Oral, Daily   • montelukast (SINGULAIR) 10 mg, Oral, Nightly   • olopatadine (Pataday) 0.2 % solution ophthalmic solution 1 drop, Both Eyes, Daily   • ondansetron (ZOFRAN) 8 MG tablet Zofran 8 mg oral tablet take 1 tablet (8 mg) by oral route PRN   Active   • pantoprazole (PROTONIX) 40 mg, Oral, 2 Times Daily   • phentermine (ADIPEX-P) 37.5 mg, Oral, 2 Times Daily   • rosuvastatin (CRESTOR) 40 mg, Oral, Nightly       The following portions of the patient's history were reviewed and updated as appropriate: allergies, current medications, past family history, past medical  "history, past social history, past surgical history, and problem list.    Objective   Vital Signs:   /70 (BP Location: Left arm)   Pulse 61   Temp 97.5 °F (36.4 °C) (Temporal)   Ht 157.5 cm (62\")   Wt 78.3 kg (172 lb 9.6 oz)   SpO2 96%   BMI 31.57 kg/m²     Wt Readings from Last 3 Encounters:   03/13/23 78.3 kg (172 lb 9.6 oz)   02/28/23 79.9 kg (176 lb 2.4 oz)   09/19/22 78.5 kg (173 lb)     BP Readings from Last 3 Encounters:   03/13/23 117/70   02/28/23 100/66   09/19/22 95/65     Physical Exam   Appearance: No acute distress, well-nourished  Head: normocephalic, atraumatic  Eyes: extraocular movements intact, no scleral icterus, no conjunctival injection  Ears, Nose, and Throat: external ears normal, nares patent, moist mucous membranes  Cardiovascular: regular rate and rhythm. no murmurs, rubs, or gallops. no edema  Respiratory: breathing comfortably, symmetric chest rise, clear to auscultation bilaterally. No wheezes, rales, or rhonchi.  Neuro: alert and oriented to time, place, and person. Normal gait  Psych: normal mood and affect     Result Review :   The following data was reviewed by: Andrew Clemens Jr, MD on 03/13/2023:  Common labs    Common Labs 3/22/22 3/22/22 3/22/22 8/9/22 8/9/22 8/9/22 2/28/23 2/28/23    0000 0000 0000 1600 1600 1600 0417 0417   Glucose     96   92   BUN     19   24 (A)   Creatinine     0.81   1.00   Sodium     141   143   Potassium     4.6   4.1   Chloride     105   108 (A)   Calcium     9.3   8.8   Albumin     4.30      Total Bilirubin     0.3      Alkaline Phosphatase     55      AST (SGOT)     19      ALT (SGPT)     18      WBC    9.25   8.10    Hemoglobin  12.3  13.0   12.3    Hematocrit    40.6   37.7    Platelets    192   170    Total Cholesterol      126     Triglycerides      181 (A)     HDL Cholesterol      49     LDL Cholesterol    71   47     Hemoglobin A1C 5.40          (A) Abnormal value                Lab Results   Component Value Date    COVID19 " Not Detected 07/07/2021        Assessment and Plan    Diagnoses and all orders for this visit:    1. Annual physical exam (Primary)    2. Breast cancer screening by mammogram    3. Primary hypertension  Comments:  well controlled. monitor closely with addition of phentermine    4. Mixed hyperlipidemia  Comments:  check labs. cont statin    5. PSVT (paroxysmal supraventricular tachycardia) (HCC)  Comments:  rate controlled. cardiology notes and holter results reviewed.     6. Acquired hypothyroidism  Comments:  check TSH.     7. Vision changes  -     Ambulatory Referral to Ophthalmology        There are no discontinued medications.     Advised on diet, physical activity, etc      Follow Up   Return in about 6 months (around 9/13/2023) for HTN, DM.  Patient was given instructions and counseling regarding her condition or for health maintenance advice. Please see specific information pulled into the AVS if appropriate.       Andrew Clemens Jr, MD  03/13/23  16:42 EDT

## 2023-04-20 RX ORDER — PANTOPRAZOLE SODIUM 40 MG/1
40 TABLET, DELAYED RELEASE ORAL 2 TIMES DAILY
Qty: 180 TABLET | Refills: 1 | Status: SHIPPED | OUTPATIENT
Start: 2023-04-20

## 2023-04-20 RX ORDER — OLOPATADINE HYDROCHLORIDE 2 MG/ML
1 SOLUTION/ DROPS OPHTHALMIC DAILY
Qty: 2.5 ML | Refills: 3 | Status: SHIPPED | OUTPATIENT
Start: 2023-04-20

## 2023-04-25 RX ORDER — ASPIRIN 81 MG/1
81 TABLET, CHEWABLE ORAL DAILY
Qty: 90 TABLET | Refills: 3 | Status: SHIPPED | OUTPATIENT
Start: 2023-04-25

## 2023-04-25 RX ORDER — ROSUVASTATIN CALCIUM 40 MG/1
40 TABLET, COATED ORAL NIGHTLY
Qty: 90 TABLET | Refills: 3 | Status: SHIPPED | OUTPATIENT
Start: 2023-04-25

## 2023-04-25 RX ORDER — DULOXETIN HYDROCHLORIDE 60 MG/1
60 CAPSULE, DELAYED RELEASE ORAL DAILY
Qty: 90 CAPSULE | Refills: 1 | Status: SHIPPED | OUTPATIENT
Start: 2023-04-25

## 2023-04-25 RX ORDER — MONTELUKAST SODIUM 10 MG/1
10 TABLET ORAL NIGHTLY
Qty: 90 TABLET | Refills: 3 | Status: SHIPPED | OUTPATIENT
Start: 2023-04-25

## 2023-04-25 RX ORDER — THYROID 30 MG/1
30 TABLET ORAL DAILY
Qty: 90 TABLET | Refills: 1 | Status: SHIPPED | OUTPATIENT
Start: 2023-04-25

## 2023-04-25 RX ORDER — ATENOLOL 25 MG/1
12.5 TABLET ORAL 2 TIMES DAILY
Qty: 90 TABLET | Refills: 1 | Status: SHIPPED | OUTPATIENT
Start: 2023-04-25

## 2023-04-25 RX ORDER — MELOXICAM 15 MG/1
15 TABLET ORAL DAILY
Qty: 90 TABLET | Refills: 1 | Status: SHIPPED | OUTPATIENT
Start: 2023-04-25

## 2023-04-25 RX ORDER — PANTOPRAZOLE SODIUM 40 MG/1
40 TABLET, DELAYED RELEASE ORAL 2 TIMES DAILY
Qty: 180 TABLET | Refills: 1 | OUTPATIENT
Start: 2023-04-25

## 2023-05-02 RX ORDER — THYROID 30 MG/1
30 TABLET ORAL DAILY
Qty: 90 TABLET | Refills: 1 | Status: CANCELLED | OUTPATIENT
Start: 2023-05-02

## 2023-05-02 RX ORDER — LEVOTHYROXINE AND LIOTHYRONINE 19; 4.5 UG/1; UG/1
30 TABLET ORAL DAILY
Qty: 90 TABLET | Refills: 1 | Status: SHIPPED | OUTPATIENT
Start: 2023-05-02

## 2023-05-02 NOTE — TELEPHONE ENCOUNTER
Patient sent in refill request. Spoke with patient and pharmacy informed her that they can't fill [place] Thyroid but can fill NP Thyroid. OK to switch to NP? Medication pended.

## 2023-05-08 ENCOUNTER — TELEPHONE (OUTPATIENT)
Dept: INTERNAL MEDICINE | Facility: CLINIC | Age: 63
End: 2023-05-08
Payer: OTHER GOVERNMENT

## 2023-05-08 RX ORDER — PANTOPRAZOLE SODIUM 40 MG/1
40 TABLET, DELAYED RELEASE ORAL 2 TIMES DAILY
Qty: 180 TABLET | Refills: 1 | Status: SHIPPED | OUTPATIENT
Start: 2023-05-08

## 2023-05-08 NOTE — TELEPHONE ENCOUNTER
Rx Refill Note  Requested Prescriptions     Pending Prescriptions Disp Refills   • pantoprazole (PROTONIX) 40 MG EC tablet 180 tablet 1     Sig: Take 1 tablet by mouth 2 (Two) Times a Day.      Last office visit with prescribing clinician: 3/13/2023   Last telemedicine visit with prescribing clinician: 9/14/2023   Next office visit with prescribing clinician: 9/14/2023                         Would you like a call back once the refill request has been completed: [] Yes [] No    If the office needs to give you a call back, can they leave a voicemail: [] Yes [] No    Gustavo Tom  05/08/23, 09:01 EDT

## 2023-05-08 NOTE — TELEPHONE ENCOUNTER
----- Message from Cassia López sent at 5/8/2023  6:53 AM EDT -----  Regarding: Pantamprazole  Contact: [phone]  Can you please send the Protonix RX to [facility] pharmacy again they said they did not get it.

## 2023-05-11 ENCOUNTER — HOSPITAL ENCOUNTER (OUTPATIENT)
Dept: OTHER | Facility: HOSPITAL | Age: 63
Discharge: HOME | End: 2023-05-11

## 2023-05-23 ENCOUNTER — OFFICE VISIT (OUTPATIENT)
Dept: INTERNAL MEDICINE | Facility: CLINIC | Age: 63
End: 2023-05-23
Payer: OTHER GOVERNMENT

## 2023-05-23 VITALS
HEART RATE: 59 BPM | HEIGHT: 62 IN | BODY MASS INDEX: 31.14 KG/M2 | TEMPERATURE: 98.2 F | WEIGHT: 169.2 LBS | SYSTOLIC BLOOD PRESSURE: 114 MMHG | OXYGEN SATURATION: 96 % | DIASTOLIC BLOOD PRESSURE: 73 MMHG

## 2023-05-23 DIAGNOSIS — M25.511 CHRONIC RIGHT SHOULDER PAIN: Primary | ICD-10-CM

## 2023-05-23 DIAGNOSIS — G89.29 CHRONIC RIGHT SHOULDER PAIN: Primary | ICD-10-CM

## 2023-05-23 PROCEDURE — 99213 OFFICE O/P EST LOW 20 MIN: CPT | Performed by: INTERNAL MEDICINE

## 2023-05-23 NOTE — PROGRESS NOTES
Chief Complaint  Shoulder Pain (Hurt bilateral in November when lifting the wrong way-never had imaging done, got better and is now worse ) and Back Pain (Up over neck and through collar bone, right more affected than left-Hurt bilateral in November when lifting the wrong way-)    Subjective          Cassia López presents to [facility]  History of Present Illness  Patient with shoulder and back pain that has been painful since 11/2022. Patient noticed pain when trying to lift a heavy trunk. Patient denies paresthesias. Patient will take motrin prn for pain.     Current Outpatient Medications   Medication Instructions   • albuterol sulfate HFA (ProAir HFA) 108 (90 Base) MCG/ACT inhaler 1 puff, Inhalation, Every 4 Hours PRN   • [place] Thyroid 30 mg, Oral, Daily   • aspirin 81 mg, Oral, Daily   • atenolol (TENORMIN) 12.5 mg, Oral, 2 Times Daily   • Carboxymethylcellul-Glycerin 0.5-0.9 % solution 1 drop   • Cholecalciferol 2,000 Units, Oral, Daily   • Diclofenac Sodium (VOLTAREN) 4 g, Topical, 4 Times Daily PRN   • DULoxetine (CYMBALTA) 60 mg, Oral, Daily   • Estriol-Estradiol (BI-EST 50:50 TD) Bi-Est 1.25 prog/200GSRCA Oral Take 1 tablet once daily   Active   • fluticasone (FLONASE) 50 MCG/ACT nasal spray 2 sprays, Nasal, Daily   • ibuprofen (ADVIL,MOTRIN) 800 mg, Oral, Every 6 Hours PRN   • lidocaine (LIDODERM) 5 % 1 patch, Transdermal, Every 24 Hours   • meloxicam (MOBIC) 15 mg, Oral, Daily   • montelukast (SINGULAIR) 10 mg, Oral, Nightly   • olopatadine (Pataday) 0.2 % solution ophthalmic solution 1 drop, Both Eyes, Daily   • ondansetron (ZOFRAN) 8 MG tablet Zofran 8 mg oral tablet take 1 tablet (8 mg) by oral route PRN   Active   • pantoprazole (PROTONIX) 40 mg, Oral, 2 Times Daily   • phentermine (ADIPEX-P) 37.5 mg, Oral, 2 Times Daily   • rosuvastatin (CRESTOR) 40 mg, Oral, Nightly   • Thyroid (NP THYROID) 30 mg, Oral, Daily       The following portions of the  "patient's history were reviewed and updated as appropriate: allergies, current medications, past family history, past medical history, past social history, past surgical history, and problem list.    Objective   Vital Signs:   /73 (BP Location: Left arm, Patient Position: Sitting, Cuff Size: Adult)   Pulse 59   Temp 98.2 °F (36.8 °C) (Temporal)   Ht 157.5 cm (62\")   Wt 76.7 kg (169 lb 3.2 oz)   SpO2 96%   BMI 30.95 kg/m²     Wt Readings from Last 3 Encounters:   05/23/23 76.7 kg (169 lb 3.2 oz)   03/13/23 78.3 kg (172 lb 9.6 oz)   02/28/23 79.9 kg (176 lb 2.4 oz)     BP Readings from Last 3 Encounters:   05/23/23 114/73   03/13/23 117/70   02/28/23 100/66     Physical Exam   Appearance: No acute distress, well-nourished  Head: normocephalic, atraumatic  Eyes: extraocular movements intact, no scleral icterus, no conjunctival injection  Ears, Nose, and Throat: external ears normal, nares patent, moist mucous membranes  Cardiovascular: regular rate and rhythm. no murmurs, rubs, or gallops. no edema  Respiratory: breathing comfortably, symmetric chest rise, clear to auscultation bilaterally. No wheezes, rales, or rhonchi.  Neuro: alert and oriented to time, place, and person. Normal gait  Psych: normal mood and affect     Result Review :   The following data was reviewed by: Andrew Clemens Jr, MD on 05/23/2023:  Common labs        8/9/2022    16:00 2/28/2023    04:17   Common Labs   Glucose 96   92     BUN 19   24     Creatinine 0.81   1.00     Sodium 141   143     Potassium 4.6   4.1     Chloride 105   108     Calcium 9.3   8.8     Albumin 4.30      Total Bilirubin 0.3      Alkaline Phosphatase 55      AST (SGOT) 19      ALT (SGPT) 18      WBC 9.25   8.10     Hemoglobin 13.0   12.3     Hematocrit 40.6   37.7     Platelets 192   170     Total Cholesterol 126      Triglycerides 181      HDL Cholesterol 49      LDL Cholesterol  47          Lab Results   Component Value Date    COVID19 Not Detected " 07/07/2021            Assessment and Plan    Diagnoses and all orders for this visit:    1. Chronic right shoulder pain (Primary)  -     XR Shoulder 2+ View Right; Future  -     Ambulatory Referral to Orthopedic Surgery  -     MRI Shoulder Right Without Contrast; Future          There are no discontinued medications.       Follow Up   Return if symptoms worsen or fail to improve.  Patient was given instructions and counseling regarding her condition or for health maintenance advice. Please see specific information pulled into the AVS if appropriate.       Andrew Clemens Jr, MD  05/25/23  13:19 EDT

## 2023-05-24 ENCOUNTER — HOSPITAL ENCOUNTER (OUTPATIENT)
Dept: GENERAL RADIOLOGY | Facility: HOSPITAL | Age: 63
Discharge: HOME | End: 2023-05-24
Admitting: INTERNAL MEDICINE
Payer: OTHER GOVERNMENT

## 2023-05-24 DIAGNOSIS — M25.511 CHRONIC RIGHT SHOULDER PAIN: ICD-10-CM

## 2023-05-24 DIAGNOSIS — G89.29 CHRONIC RIGHT SHOULDER PAIN: ICD-10-CM

## 2023-05-24 PROCEDURE — 73030 X-RAY EXAM OF SHOULDER: CPT

## 2023-07-25 RX ORDER — ROSUVASTATIN CALCIUM 40 MG/1
40 TABLET, COATED ORAL NIGHTLY
Qty: 90 TABLET | Refills: 3 | Status: CANCELLED | OUTPATIENT
Start: 2023-07-25

## 2023-07-26 RX ORDER — OLOPATADINE HYDROCHLORIDE 2 MG/ML
1 SOLUTION/ DROPS OPHTHALMIC DAILY
Qty: 2.5 ML | Refills: 3 | Status: SHIPPED | OUTPATIENT
Start: 2023-07-26

## 2023-07-26 RX ORDER — THYROID 30 MG/1
30 TABLET ORAL DAILY
Qty: 90 TABLET | Refills: 1 | Status: SHIPPED | OUTPATIENT
Start: 2023-07-26

## 2023-07-26 RX ORDER — IBUPROFEN 800 MG/1
800 TABLET ORAL EVERY 6 HOURS PRN
Qty: 90 TABLET | Refills: 3 | Status: SHIPPED | OUTPATIENT
Start: 2023-07-26

## 2023-07-26 RX ORDER — DULOXETIN HYDROCHLORIDE 60 MG/1
60 CAPSULE, DELAYED RELEASE ORAL DAILY
Qty: 90 CAPSULE | Refills: 1 | Status: SHIPPED | OUTPATIENT
Start: 2023-07-26

## 2023-07-26 RX ORDER — ATENOLOL 25 MG/1
12.5 TABLET ORAL 2 TIMES DAILY
Qty: 90 TABLET | Refills: 1 | Status: SHIPPED | OUTPATIENT
Start: 2023-07-26

## 2023-07-26 RX ORDER — ONDANSETRON HYDROCHLORIDE 8 MG/1
8 TABLET, FILM COATED ORAL EVERY 8 HOURS PRN
Qty: 20 TABLET | Refills: 0 | Status: SHIPPED | OUTPATIENT
Start: 2023-07-26

## 2023-08-03 ENCOUNTER — TELEPHONE (OUTPATIENT)
Dept: ORTHOPEDIC SURGERY | Facility: CLINIC | Age: 63
End: 2023-08-03
Payer: OTHER GOVERNMENT

## 2023-08-04 ENCOUNTER — TELEPHONE (OUTPATIENT)
Dept: ORTHOPEDIC SURGERY | Facility: CLINIC | Age: 63
End: 2023-08-04
Payer: OTHER GOVERNMENT

## 2023-08-11 ENCOUNTER — OFFICE VISIT (OUTPATIENT)
Dept: ORTHOPEDIC SURGERY | Facility: CLINIC | Age: 63
End: 2023-08-11
Payer: OTHER GOVERNMENT

## 2023-08-11 VITALS
BODY MASS INDEX: 31.1 KG/M2 | HEIGHT: 62 IN | WEIGHT: 169 LBS | DIASTOLIC BLOOD PRESSURE: 61 MMHG | OXYGEN SATURATION: 93 % | HEART RATE: 70 BPM | SYSTOLIC BLOOD PRESSURE: 101 MMHG

## 2023-08-11 DIAGNOSIS — M25.511 RIGHT SHOULDER PAIN, UNSPECIFIED CHRONICITY: Primary | ICD-10-CM

## 2023-08-11 DIAGNOSIS — M19.019 OSTEOARTHRITIS OF AC (ACROMIOCLAVICULAR) JOINT: ICD-10-CM

## 2023-08-11 DIAGNOSIS — D16.9 ENCHONDROMA: ICD-10-CM

## 2023-08-11 DIAGNOSIS — S46.219A BICEPS TENDON TEAR: ICD-10-CM

## 2023-08-11 RX ORDER — LIDOCAINE HYDROCHLORIDE 10 MG/ML
5 INJECTION, SOLUTION INFILTRATION; PERINEURAL
Status: COMPLETED | OUTPATIENT
Start: 2023-08-11 | End: 2023-08-11

## 2023-08-11 RX ORDER — TRIAMCINOLONE ACETONIDE 40 MG/ML
40 INJECTION, SUSPENSION INTRA-ARTICULAR; INTRAMUSCULAR
Status: COMPLETED | OUTPATIENT
Start: 2023-08-11 | End: 2023-08-11

## 2023-08-11 RX ADMIN — LIDOCAINE HYDROCHLORIDE 5 ML: 10 INJECTION, SOLUTION INFILTRATION; PERINEURAL at 09:45

## 2023-08-11 RX ADMIN — TRIAMCINOLONE ACETONIDE 40 MG: 40 INJECTION, SUSPENSION INTRA-ARTICULAR; INTRAMUSCULAR at 09:45

## 2023-08-11 NOTE — PROGRESS NOTES
"Chief Complaint  Pain and Initial Evaluation of the Right Shoulder     Subjective      Cassia López presents to [facility] for evaluation of the right shoulder. The patient reports right shoulder pain that started in November after lifting something heavy. She reports pain at night. She locates the pain to her SC joint and acromioclavicular joint and posterior shoulder. She reports it radiates to her lateral arm. She has no other complaints. She has been doing some home exercises.     Allergies   Allergen Reactions    Doxycycline Hyclate Other (See Comments)     Throat pain    Oxycodone-Acetaminophen Hallucinations    Sulfa Antibiotics Rash    Tetracycline Other (See Comments)     Throat pain          Social History     Socioeconomic History    Marital status:    Tobacco Use    Smoking status: Never    Smokeless tobacco: Never   Vaping Use    Vaping Use: Never used   Substance and Sexual Activity    Alcohol use: Yes     Alcohol/week: 1.0 standard drink     Types: 1 Glasses of wine per week     Comment: 1 glass wine per week     Drug use: Never    Sexual activity: Defer        I reviewed the patient's chief complaint, history of present illness, review of systems, past medical history, surgical history, family history, social history, medications, and allergy list.     Review of Systems     Constitutional: Denies fevers, chills, weight loss  Cardiovascular: Denies chest pain, shortness of breath  Skin: Denies rashes, acute skin changes  Neurologic: Denies headache, loss of consciousness  MSK: Right shoulder pain      Vital Signs:   /61   Pulse 70   Ht 157.5 cm (62\")   Wt 76.7 kg (169 lb)   SpO2 93%   BMI 30.91 kg/mý          Physical Exam  General: Alert. No acute distress    Ortho Exam      Right shoulder- tender to the acromioclavicular joint, non-tender to the SC joint. Forward elevation 170. Abduction 150. External Rotation 90. Internal rotation 70. 5/5 " supraspinatus strength. 5/5 infraspinatus and subscapularis. Internal rotation to L-4. Negative lift off. Equivocal [place]. Pain with speeds testing. Pain with cross arm adduction     Large Joint Arthrocentesis  Date/Time: 8/11/2023 9:45 AM  Consent given by: patient  Site marked: site marked  Timeout: Immediately prior to procedure a time out was called to verify the correct patient, procedure, equipment, support staff and site/side marked as required   Supporting Documentation  Indications: pain   Procedure Details  Location: shoulder (right) -   Needle gauge: 21g.  Medications administered: 5 mL lidocaine 1 %; 40 mg triamcinolone acetonide 40 MG/ML  Patient tolerance: patient tolerated the procedure well with no immediate complications      X-Ray Report:  Right scapula X-Ray  Indication: Evaluation of right shoulder pain  AP/Lateral view(s)  Findings: no acute fracture. Mild degenerative changes to the shoulder and acromioclavicular joint. Small suspected enchondroma to the humeral head.   Prior studies available for comparison: no     MRI [facility]-  Mild acromioclavicular osteoarthritis  2. Small split tear of the biceps long head tendon  3. 1.1 cm chondroid lesion in the posterior aspect of the humeral head.  Statistically, this most   likely represents a benign enchondroma.  A low-grade chondrosarcoma could have a similar imaging   appearance.      Imaging Results (Most Recent)       Procedure Component Value Units Date/Time    XR Scapula Right [[id number]] Resulted: 08/11/23 0910     Updated: 08/11/23 0920             Result Review :       No results found.           Assessment and Plan     Diagnoses and all orders for this visit:    1. Right shoulder pain, unspecified chronicity (Primary)  -     XR Scapula Right    2. Osteoarthritis of AC (acromioclavicular) joint    3. Biceps tendon tear    4. Enchondroma        Discussed the treatment plan with the patient.  I reviewed the x-rays that were obtained today with  the patient. Plan for conservative treatment at this time. Discussed the risks and benefits of a right shoulder steroid injection. The patient expressed understanding and wished to proceed. She tolerated the injection well. Home exercises given today. Plan to continue mobic.     Call or return if worsening symptoms.    Follow Up     6 weeks      Patient was given instructions and counseling regarding her condition or for health maintenance advice. Please see specific information pulled into the AVS if appropriate.     Scribed for Sree Robles MD by Lurdes Mukherjee.  08/11/23   09:13 EDT    I have personally performed the services described in this document as scribed by the above individual and it is both accurate and complete. Sree Robles MD 08/11/23

## 2023-08-15 ENCOUNTER — TELEPHONE (OUTPATIENT)
Dept: GENETICS | Facility: HOSPITAL | Age: 63
End: 2023-08-15
Payer: OTHER GOVERNMENT

## 2023-08-15 NOTE — TELEPHONE ENCOUNTER
Called patient and gathered family history prior to genetic appt tomorrow. Pt is scheduled for a phone visit but requests this be switched to an in-person appt.

## 2023-08-16 ENCOUNTER — CLINICAL SUPPORT (OUTPATIENT)
Dept: GENETICS | Facility: HOSPITAL | Age: 63
End: 2023-08-16
Payer: OTHER GOVERNMENT

## 2023-08-16 ENCOUNTER — LAB (OUTPATIENT)
Dept: LAB | Facility: HOSPITAL | Age: 63
End: 2023-08-16
Payer: OTHER GOVERNMENT

## 2023-08-16 DIAGNOSIS — Z80.0 FAMILY HISTORY OF COLON CANCER: ICD-10-CM

## 2023-08-16 DIAGNOSIS — Z80.49 FAMILY HISTORY OF UTERINE CANCER: ICD-10-CM

## 2023-08-16 DIAGNOSIS — Z13.79 GENETIC TESTING: Primary | ICD-10-CM

## 2023-08-16 DIAGNOSIS — Z80.51 FAMILY HISTORY OF KIDNEY CANCER: ICD-10-CM

## 2023-08-16 DIAGNOSIS — Z80.3 FAMILY HISTORY OF BREAST CANCER: ICD-10-CM

## 2023-08-16 PROCEDURE — 96040: CPT

## 2023-08-16 NOTE — PROGRESS NOTES
Cassia López, a 62-year-old female, was referred for genetic counseling due to a family history of breast cancer. Ms. López has no personal history of cancer. She was 13 at menarche and had her first child at age 22. She is post-menopausal and retains her uterus and ovaries. She had a uterine ablation in 2015. Ms. López has annual mammograms and reports no prior breast biopsies. She had her last colonoscopy in 2019 and reports a 5 year interval for those. She reports she started her colonoscopies early due to GI issues. She reports no polyps on colonoscopy. She was interested in discussing her risk for a hereditary cancer syndrome. Ms. López decided to pursue comprehensive genetic testing to evaluate her risk of cancer, therefore the Multi-Cancer Panel was ordered through [facility] which analyzes 84 genes associated with an increased cancer risk. Her blood was drawn on 8/16/23. Results are expected in 2-3 weeks.      PERTINENT FAMILY HISTORY: (See attached pedigree)   Mother:   Bladder cancer, 80  Sister 1:    Cervical cancer  Sister 2:    Leukemia, 40  Pat. Uncle:    Throat cancer  Pat. Aunt:    Unknown cancer  Pat. Cousin:    Unknown cancer  Mat. Half-Sister 1:  Uterine cancer, passed at 44      Breast cancer, passed at 44  Mat. Half-Brother:   Throat cancer  Mat. Half-Sister 2:   Gallbladder cancer  Mat. Uncle 1:    Unknown cancer  Mat. Aunt:    Lung cancer  Mat. Uncle 2:   Lung cancer  Mat. Uncle 3:   Unknown cancer  Mat. Cousin 1:   Unknown cancer, 50s  Mat. Cousin 2:   Unknown cancer  Mat. Cousin 3:   Colon cancer  Mat. Cousin 4:                        Unknown cancer  Mat. Cousin 5:   Kidney cancer  Mat. Cousin 6:   Kidney cancer    We do not have medical records regarding any of these diagnoses.     RISK ASSESSMENT:  Ms. López's family history of breast cancer raises the question of a hereditary cancer syndrome.  NCCN guidelines for genetic testing for BRCA1/2 states that individuals with a close relative  diagnosed with breast cancer below the age of 50 may consider genetic testing. Based on Ms. López's maternal half-sister's diagnosis of breast cancer at 44, she would clearly meet this criteria. This risk assessment is based on the family history information provided at the time of the appointment.  The assessment could change in the future should new information be obtained.    GENETIC COUNSELING (30 minutes):  We reviewed the family history information in detail. Cases of cancer follow three general patterns: sporadic, familial, and hereditary.  While most cancer is sporadic, some cases appear to occur in family clusters.  These cases are said to be familial and account for 10-20% of cancer cases.  Familial cases may be due to a combination of shared genes and environmental factors among family members.  In even fewer cases, the risk for cancer is inherited, and the genes responsible for the increased cancer risk are known.       Family histories typical of hereditary cancer syndromes usually include multiple first- and second-degree relatives diagnosed with cancer types that define a syndrome.  These cases tend to be diagnosed at younger-than-expected ages and can be bilateral or multifocal.  The cancer in these families follows an autosomal dominant inheritance pattern, which indicates the likely presence of a mutation in a cancer susceptibility gene.  Children and siblings of an individual believed to carry this mutation have a 50% chance of inheriting that mutation, thereby inheriting the increased risk to develop cancer.  These mutations can be passed down from the maternal or the paternal lineage.     Due to Ms. López's family history of breast cancer, we discussed hereditary breast cancer. Hereditary breast cancer accounts for 5-10% of all cases of breast cancer.  A significant proportion of hereditary breast cancer can be attributed to mutations in the BRCA1 and BRCA2 genes.  Mutations in these genes  confer an increased risk for breast cancer, ovarian cancer, male breast cancer, prostate cancer, and pancreatic cancer.  Women with a BRCA1 or BRCA2 mutation have up to an 87% lifetime risk of breast cancer and up to a 60% risk of ovarian cancer.  There are other clinically significant breast cancer related genes in addition to BRCA1/2.      We discussed that Weeks syndrome is the most common form of hereditary colon cancer. It is an autosomal dominant condition caused by mutations in mismatch repair genes.  The lifetime risk for colon cancer for individuals with Weeks syndrome is up to 80% if there is no intervention (i.e. removal of polyps detected on colonoscopy).  Frequently (60-70% of the time) the polyps and colon cancer in Weeks syndrome arise on the right side of the colon. Routine screening colonoscopy has been shown to significantly reduce the incidence and mortality of colon cancer in families with Weeks syndrome. Other risks include endometrial cancer (up to 60%), as well as other cancers (ovarian, upper GI, brain, stomach, pancreatic). We discussed the national management guidelines that have been established for individuals known to have Weeks syndrome.        There are other genes that are known to be associated with an increased risk for cancer.  Some of these genes have well defined cancer risks and established management guidelines.  Other genes that can be tested for have been more recently described, and there may be less data regarding the risks and therefore may not have established management guidelines. We discussed these limitations at length.  Based on Ms. López's desire to get as much information as possible regarding her personal risks and potential risks for her family, she opted to pursue testing through a panel that would look at several other genes known to increase the risk for cancer.     GENETIC TESTING:  The risks, benefits, and limitations of genetic testing and implications for  clinical management following testing were reviewed.  DNA test results can influence decisions regarding screening and prevention.  Genetic testing can have significant psychological implications for both individuals and families. Also discussed was the possibility of employment and insurance discrimination based on genetic test results and the laws in place to prevent this, as well as the limitations of these laws.       We discussed panel testing, which would involve testing 84 genes associated with increased cancer risk. The implications of a positive or negative test result were discussed.  We also discussed the importance of testing an affected relative and how a negative result for Ms. López wouldn't necessarily mean the cancers presenting in her family weren't due to a genetic mutation that Ms. López did not inherit.  In general, a negative genetic test result is most informative if a mutation has first been established in an affected member of the family.  In cases where an affected individual is not available or interested in testing, it is appropriate to offer testing to an unaffected individual.     We discussed the possibility that, in some cases, genetic test results may be ambiguous due to the identification of a genetic variant of uncertain significance (VUS). These variants may or may not be associated with an increased cancer risk. With multigene panel testing, it is not uncommon for a VUS to be identified.  If a VUS is identified, testing family members is typically not recommended and screening recommendations are made based on the family history.  The laboratories that perform genetic testing work to reclassify the VUS and send out an amended report if and when a VUS is reclassified.  The majority of variant findings are ultimately reclassified to a negative result. Given Ms. López's family history, a negative test result does not eliminate all cancer risk, although the risk may not be as high as  it would with positive genetic testing.     PLAN: Genetic testing was ordered via the Multi-Cancer Panel through [facility]. Results are expected in 2-3 weeks. If she has any questions in the meantime, she is welcome to call me at [phone].      Amy Lopes MS, [facility] [place] [facility]  Licensed Certified Genetic Counselor

## 2023-09-14 ENCOUNTER — OFFICE VISIT (OUTPATIENT)
Dept: INTERNAL MEDICINE | Facility: CLINIC | Age: 63
End: 2023-09-14
Payer: OTHER GOVERNMENT

## 2023-09-14 VITALS
OXYGEN SATURATION: 95 % | SYSTOLIC BLOOD PRESSURE: 109 MMHG | WEIGHT: 170.2 LBS | TEMPERATURE: 98.2 F | BODY MASS INDEX: 31.32 KG/M2 | DIASTOLIC BLOOD PRESSURE: 65 MMHG | HEIGHT: 62 IN | HEART RATE: 64 BPM

## 2023-09-14 DIAGNOSIS — E03.9 ACQUIRED HYPOTHYROIDISM: ICD-10-CM

## 2023-09-14 DIAGNOSIS — E78.2 MIXED HYPERLIPIDEMIA: ICD-10-CM

## 2023-09-14 DIAGNOSIS — Z23 NEED FOR INFLUENZA VACCINATION: ICD-10-CM

## 2023-09-14 DIAGNOSIS — I10 PRIMARY HYPERTENSION: Primary | ICD-10-CM

## 2023-09-14 DIAGNOSIS — G89.29 CHRONIC BILATERAL LOW BACK PAIN WITHOUT SCIATICA: ICD-10-CM

## 2023-09-14 DIAGNOSIS — M54.50 CHRONIC BILATERAL LOW BACK PAIN WITHOUT SCIATICA: ICD-10-CM

## 2023-09-14 LAB
ALBUMIN SERPL-MCNC: 4 G/DL (ref 3.5–5.2)
ALBUMIN/GLOB SERPL: 1.7 G/DL
ALP SERPL-CCNC: 52 U/L (ref 39–117)
ALT SERPL W P-5'-P-CCNC: 11 U/L (ref 1–33)
ANION GAP SERPL CALCULATED.3IONS-SCNC: 37 MMOL/L (ref 5–15)
AST SERPL-CCNC: 20 U/L (ref 1–32)
BASOPHILS # BLD AUTO: 0.02 10*3/MM3 (ref 0–0.2)
BASOPHILS NFR BLD AUTO: 0.2 % (ref 0–1.5)
BILIRUB SERPL-MCNC: 0.2 MG/DL (ref 0–1.2)
BUN SERPL-MCNC: 20 MG/DL (ref 8–23)
BUN/CREAT SERPL: 20.6 (ref 7–25)
CALCIUM SPEC-SCNC: 9.1 MG/DL (ref 8.6–10.5)
CHLORIDE SERPL-SCNC: 77 MMOL/L (ref 98–107)
CHOLEST SERPL-MCNC: 127 MG/DL (ref 0–200)
CO2 SERPL-SCNC: 27 MMOL/L (ref 22–29)
CREAT SERPL-MCNC: 0.97 MG/DL (ref 0.57–1)
DEPRECATED RDW RBC AUTO: 40.5 FL (ref 37–54)
EGFRCR SERPLBLD CKD-EPI 2021: 66.2 ML/MIN/1.73
EOSINOPHIL # BLD AUTO: 0.12 10*3/MM3 (ref 0–0.4)
EOSINOPHIL NFR BLD AUTO: 1.4 % (ref 0.3–6.2)
ERYTHROCYTE [DISTWIDTH] IN BLOOD BY AUTOMATED COUNT: 12.6 % (ref 12.3–15.4)
GLOBULIN UR ELPH-MCNC: 2.4 GM/DL
GLUCOSE SERPL-MCNC: 101 MG/DL (ref 65–99)
HCT VFR BLD AUTO: 37.2 % (ref 34–46.6)
HDLC SERPL-MCNC: 51 MG/DL (ref 40–60)
HGB BLD-MCNC: 12.1 G/DL (ref 12–15.9)
IMM GRANULOCYTES # BLD AUTO: 0.02 10*3/MM3 (ref 0–0.05)
IMM GRANULOCYTES NFR BLD AUTO: 0.2 % (ref 0–0.5)
LDLC SERPL CALC-MCNC: 54 MG/DL (ref 0–100)
LDLC/HDLC SERPL: 1 {RATIO}
LYMPHOCYTES # BLD AUTO: 2.97 10*3/MM3 (ref 0.7–3.1)
LYMPHOCYTES NFR BLD AUTO: 35.5 % (ref 19.6–45.3)
MCH RBC QN AUTO: 28.9 PG (ref 26.6–33)
MCHC RBC AUTO-ENTMCNC: 32.5 G/DL (ref 31.5–35.7)
MCV RBC AUTO: 88.8 FL (ref 79–97)
MONOCYTES # BLD AUTO: 0.53 10*3/MM3 (ref 0.1–0.9)
MONOCYTES NFR BLD AUTO: 6.3 % (ref 5–12)
NEUTROPHILS NFR BLD AUTO: 4.7 10*3/MM3 (ref 1.7–7)
NEUTROPHILS NFR BLD AUTO: 56.4 % (ref 42.7–76)
NRBC BLD AUTO-RTO: 0 /100 WBC (ref 0–0.2)
PLATELET # BLD AUTO: 176 10*3/MM3 (ref 140–450)
PMV BLD AUTO: 11 FL (ref 6–12)
POTASSIUM SERPL-SCNC: 5.8 MMOL/L (ref 3.5–5.2)
PROT SERPL-MCNC: 6.4 G/DL (ref 6–8.5)
RBC # BLD AUTO: 4.19 10*6/MM3 (ref 3.77–5.28)
SODIUM SERPL-SCNC: 141 MMOL/L (ref 136–145)
TRIGL SERPL-MCNC: 124 MG/DL (ref 0–150)
TSH SERPL DL<=0.05 MIU/L-ACNC: 1.18 UIU/ML (ref 0.27–4.2)
VLDLC SERPL-MCNC: 22 MG/DL (ref 5–40)
WBC NRBC COR # BLD: 8.36 10*3/MM3 (ref 3.4–10.8)

## 2023-09-14 PROCEDURE — 80053 COMPREHEN METABOLIC PANEL: CPT | Performed by: INTERNAL MEDICINE

## 2023-09-14 PROCEDURE — 84443 ASSAY THYROID STIM HORMONE: CPT | Performed by: INTERNAL MEDICINE

## 2023-09-14 PROCEDURE — 80061 LIPID PANEL: CPT | Performed by: INTERNAL MEDICINE

## 2023-09-14 PROCEDURE — 85025 COMPLETE CBC W/AUTO DIFF WBC: CPT | Performed by: INTERNAL MEDICINE

## 2023-09-14 RX ORDER — OLOPATADINE HYDROCHLORIDE 1 MG/ML
1 SOLUTION/ DROPS OPHTHALMIC 2 TIMES DAILY
Qty: 15 ML | Refills: 3 | Status: SHIPPED | OUTPATIENT
Start: 2023-09-14

## 2023-09-14 RX ORDER — BENZONATATE 100 MG/1
CAPSULE ORAL
COMMUNITY
Start: 2023-08-07 | End: 2023-09-14

## 2023-09-14 RX ORDER — AZITHROMYCIN 250 MG/1
TABLET, FILM COATED ORAL
COMMUNITY
Start: 2023-08-07 | End: 2023-09-14

## 2023-09-14 RX ORDER — METHYLPREDNISOLONE 4 MG/1
TABLET ORAL
COMMUNITY
Start: 2023-08-07 | End: 2023-09-14

## 2023-09-14 NOTE — LETTER
[address]  [place] [address]  [phone]       [facility]  IMMUNIZATION CERTIFICATE    (Required for each child enrolled in day care center, certified family  home, other licensed facility which cares for children,  programs, and public and private primary and secondary schools.)    Name of Child:  Cassia López  YOB: 1960   Name of Parent:  ______________________________  Address:  [facility] [address] [address]     VACCINE/DOSE DATE DATE   Hepatitis B     Alt. Adult Hepatitis B¹     DTap/DTP/DT²     Hib³     Pneumococcal (PCV13)     Polio     Influenza 10/3/2022 9/14/2023   MMR     Varicella     Hepatitis A     Meningococcal     Td 8/9/2022    Tdap     Rotavirus     HPV     Men B     Pneumococcal (PPSV23)       ¹ Alternative two dose series of approved adult hepatitis B vaccine for adolescents 11 through 15 years of age. ² DTaP, DTP, or DT. ³ Hib not required at 5 years of age or more.    Had Chickenpox or Zoster disease: No     This child is current for immunizations until  /  /  , (14 days after the next shot is due) after which this certificate is no longer valid, and a new certificate must be obtained.   This child is not up-to-date at this time.  This certificate is valid unti  /  /  ,l  (14 days after the next shot is due) after which this certificate is no longer valid, and a new certificate must be obtained.    Reason child is not up-to-date:   Provisional Status - Child is behind on required immunizations.   Medical Exemption - The following immunizations are not medically indicated:  ___________________                                      _______________________________________________________________________________       If Medical Exemption, can these vaccines be administered at a later date?  No:  _  Yes: _  Date: __/__/__    [employer] Objection  I CERTIFY THAT THE ABOVE NAMED CHILD HAS RECEIVED IMMUNIZATIONS AS  STIPULATED ABOVE.     __________________________________________________________     Date: 9/14/2023   (Signature of physician, APRN, PA, pharmacist, LHD , RN or LPN designee)      This Certificate should be presented to the school or facility in which the child intends to enroll and should be retained by the school or facility and filed with the child's health record.

## 2023-09-15 ENCOUNTER — TELEPHONE (OUTPATIENT)
Dept: GENETICS | Facility: HOSPITAL | Age: 63
End: 2023-09-15
Payer: OTHER GOVERNMENT

## 2023-09-15 NOTE — TELEPHONE ENCOUNTER
Spoke with patient and disclosed negative genetic results. Informed patient these results would be on [facility] and sent to her Dr. Patient requested a copy be mailed to her.  
tender

## 2023-09-18 ENCOUNTER — DOCUMENTATION (OUTPATIENT)
Dept: GENETICS | Facility: HOSPITAL | Age: 63
End: 2023-09-18
Payer: OTHER GOVERNMENT

## 2023-09-18 NOTE — PROGRESS NOTES
Cassia López, a 62-year-old female, was referred for genetic counseling due to a family history of breast cancer. Ms. López has no personal history of cancer. She was 13 at menarche and had her first child at age 22. She is post-menopausal and retains her uterus and ovaries. She had a uterine ablation in 2015. Ms. López has annual mammograms and reports no prior breast biopsies. She had her last colonoscopy in 2019 and reports a 5 year interval for those. She reports she started her colonoscopies early due to GI issues. She reports no polyps on colonoscopy. She was interested in discussing her risk for a hereditary cancer syndrome. Ms. López decided to pursue comprehensive genetic testing to evaluate her risk of cancer, therefore the Multi-Cancer Panel was ordered through [facility] which analyzes 84 genes associated with an increased cancer risk. Genetic testing was negative for pathogenic mutations in BRCA1/2 and 82 additional genes on the Multi-Cancer panel. These results were discussed with Ms. López by telephone on 9/15/23.     PERTINENT FAMILY HISTORY: (See attached pedigree)   Mother:                                    Bladder cancer, 80  Sister 1:                                   Cervical cancer  Sister 2:                                   Leukemia, 40  Pat. Uncle:                              Throat cancer  Pat. Aunt:                                Unknown cancer  Pat. Cousin:                            Unknown cancer  Mat. Half-Sister 1:                   Uterine cancer, passed at 44                                                  Breast cancer, passed at 44  Mat. Half-Brother:                   Throat cancer  Mat. Half-Sister 2:                   Gallbladder cancer  Mat. Uncle 1:                           Unknown cancer  Mat. Aunt:                                Lung cancer  Mat. Uncle 2:                           Lung cancer  Mat. Uncle 3:                           Unknown cancer  Mat. Cousin 1:                          Unknown cancer, 50s  Mat. Cousin 2:                         Unknown cancer  Mat. Cousin 3:                         Colon cancer  Mat. Cousin 4:                        Unknown cancer  Mat. Cousin 5:                         Kidney cancer  Mat. Cousin 6:                         Kidney cancer     We do not have medical records regarding any of these diagnoses.      RISK ASSESSMENT:  Ms. López's family history of breast cancer raises the question of a hereditary cancer syndrome.  NCCN guidelines for genetic testing for BRCA1/2 states that individuals with a close relative diagnosed with breast cancer below the age of 50 may consider genetic testing. Based on Ms. López's maternal half-sister's diagnosis of breast cancer at 44, she would clearly meet this criteria. This risk assessment is based on the family history information provided at the time of the appointment.  The assessment could change in the future should new information be obtained.    Because genetic testing was negative, Ms. López's management should be guided by a family history-based risk assessment.  [employer], version 8 is able to take into account personal factors (age at menarche, age at first birth, etc.) and family history when calculating risk for breast cancer. Computer modeling estimates that Ms. Fernandezs lifetime personal risk for developing breast cancer is up to 4.4% (Tesha-Toyzick, v8), in comparison to the average 62-year-old female's risk of 6.7%. Per NCCN guidelines, a lifetime risk above 20% is considered to be “high risk” where increased screening is warranted; Ms. Adrian risk does not fall into that category.  This risk assessment is based on the family history information provided at the time of the appointment and could change in the future should new information be obtained.      GENETIC COUNSELING (30 minutes):  We reviewed the family history information in detail. Cases of cancer follow three general patterns:  sporadic, familial, and hereditary.  While most cancer is sporadic, some cases appear to occur in family clusters.  These cases are said to be familial and account for 10-20% of cancer cases.  Familial cases may be due to a combination of shared genes and environmental factors among family members.  In even fewer cases, the risk for cancer is inherited, and the genes responsible for the increased cancer risk are known.       Family histories typical of hereditary cancer syndromes usually include multiple first- and second-degree relatives diagnosed with cancer types that define a syndrome.  These cases tend to be diagnosed at younger-than-expected ages and can be bilateral or multifocal.  The cancer in these families follows an autosomal dominant inheritance pattern, which indicates the likely presence of a mutation in a cancer susceptibility gene.  Children and siblings of an individual believed to carry this mutation have a 50% chance of inheriting that mutation, thereby inheriting the increased risk to develop cancer.  These mutations can be passed down from the maternal or the paternal lineage.     Due to Ms. López's family history of breast cancer, we discussed hereditary breast cancer. Hereditary breast cancer accounts for 5-10% of all cases of breast cancer.  A significant proportion of hereditary breast cancer can be attributed to mutations in the BRCA1 and BRCA2 genes.  Mutations in these genes confer an increased risk for breast cancer, ovarian cancer, male breast cancer, prostate cancer, and pancreatic cancer.  Women with a BRCA1 or BRCA2 mutation have up to an 87% lifetime risk of breast cancer and up to a 60% risk of ovarian cancer.  There are other clinically significant breast cancer related genes in addition to BRCA1/2.       We discussed that Weeks syndrome is the most common form of hereditary colon cancer. It is an autosomal dominant condition caused by mutations in mismatch repair genes.  The  lifetime risk for colon cancer for individuals with Weeks syndrome is up to 80% if there is no intervention (i.e. removal of polyps detected on colonoscopy).  Frequently (60-70% of the time) the polyps and colon cancer in Weeks syndrome arise on the right side of the colon. Routine screening colonoscopy has been shown to significantly reduce the incidence and mortality of colon cancer in families with Weeks syndrome. Other risks include endometrial cancer (up to 60%), as well as other cancers (ovarian, upper GI, brain, stomach, pancreatic). We discussed the national management guidelines that have been established for individuals known to have Weeks syndrome.         There are other genes that are known to be associated with an increased risk for cancer.  Some of these genes have well defined cancer risks and established management guidelines.  Other genes that can be tested for have been more recently described, and there may be less data regarding the risks and therefore may not have established management guidelines. We discussed these limitations at length.  Based on Ms. López's desire to get as much information as possible regarding her personal risks and potential risks for her family, she opted to pursue testing through a panel that would look at several other genes known to increase the risk for cancer.     GENETIC TESTING:  The risks, benefits, and limitations of genetic testing and implications for clinical management following testing were reviewed.  DNA test results can influence decisions regarding screening and prevention.  Genetic testing can have significant psychological implications for both individuals and families. Also discussed was the possibility of employment and insurance discrimination based on genetic test results and the laws in place to prevent this, as well as the limitations of these laws.       We discussed panel testing, which would involve testing 84 genes associated with increased  cancer risk. The implications of a positive or negative test result were discussed.  We also discussed the importance of testing an affected relative and how a negative result for Ms. López wouldn't necessarily mean the cancers presenting in her family weren't due to a genetic mutation that Ms. López did not inherit.  In general, a negative genetic test result is most informative if a mutation has first been established in an affected member of the family.  In cases where an affected individual is not available or interested in testing, it is appropriate to offer testing to an unaffected individual.      We discussed the possibility that, in some cases, genetic test results may be ambiguous due to the identification of a genetic variant of uncertain significance (VUS). These variants may or may not be associated with an increased cancer risk. With multigene panel testing, it is not uncommon for a VUS to be identified.  If a VUS is identified, testing family members is typically not recommended and screening recommendations are made based on the family history.  The laboratories that perform genetic testing work to reclassify the VUS and send out an amended report if and when a VUS is reclassified.  The majority of variant findings are ultimately reclassified to a negative result. Given Ms. López's family history, a negative test result does not eliminate all cancer risk, although the risk may not be as high as it would with positive genetic testing.      TEST RESULTS: Genetic testing was negative for known pathogenic mutations by sequencing, rearrangement testing, and RNA analysis for the genes on the Multi-Cancer panel (see attached results).  This negative result greatly lowers, but does not eliminate, the risk of a hereditary cancer syndrome for Ms. López. This assessment is based on the information provided at the time of the consultation.    CANCER PREVENTION: Options available to individuals with an elevated  lifetime risk for breast cancer were briefly discussed, including increased surveillance and chemoprevention.  Based on computer modeling, Ms. López's lifetime risk for breast cancer would not be considered “high risk”. Per NCCN guidelines, it is appropriate for her to follow general population screening guidelines for her breast cancer risk, including annual clinical breast exam and annual mammogram beginning at age 40. This assessment is based on the information provided at the time of the consultation and could change should new information become available regarding the family history.    PLAN:  Genetic counseling remains available to Ms. López. If she has any questions or concerns, she is welcome to contact us at [phone].     Amy Lopes MS, [facility] [place], [facility]  Licensed Certified Genetic Counselor    Cc: Cassia Gardiner MD

## 2023-10-05 PROBLEM — S46.219A BICEPS TENDON TEAR: Status: ACTIVE | Noted: 2023-10-05

## 2023-10-05 PROBLEM — M19.019 OSTEOARTHRITIS OF AC (ACROMIOCLAVICULAR) JOINT: Status: ACTIVE | Noted: 2023-10-05

## 2023-10-05 PROBLEM — M25.511 RIGHT SHOULDER PAIN: Status: ACTIVE | Noted: 2023-10-05

## 2023-10-06 ENCOUNTER — OFFICE VISIT (OUTPATIENT)
Dept: ORTHOPEDIC SURGERY | Facility: CLINIC | Age: 63
End: 2023-10-06
Payer: OTHER GOVERNMENT

## 2023-10-06 VITALS — WEIGHT: 170 LBS | BODY MASS INDEX: 31.28 KG/M2 | HEIGHT: 62 IN

## 2023-10-06 DIAGNOSIS — S46.219A BICEPS TENDON TEAR: ICD-10-CM

## 2023-10-06 DIAGNOSIS — M25.511 RIGHT SHOULDER PAIN, UNSPECIFIED CHRONICITY: Primary | ICD-10-CM

## 2023-10-06 DIAGNOSIS — M19.019 OSTEOARTHRITIS OF AC (ACROMIOCLAVICULAR) JOINT: ICD-10-CM

## 2023-10-06 NOTE — PROGRESS NOTES
Chief Complaint  Pain and Follow-up of the Right Shoulder    Subjective          History of Present Illness      Cassia López is a 63 y.o. female  presents to [facility] for     Patient presents for follow-up evaluation of right shoulder pain, right shoulder biceps tendon tear, osteoarthritis, AC arthritis, enchondroma.  She was seen on 8/11/2023 for evaluation her MRI and x-ray were reviewed with her.  Patient received an injection which she states did help her shoulder pain she has increased range of motion, decreased pain.  If she does have pain is when she reaches out or reaches across her chest.  She states the injections are wearing off about a week ago but she states it is still better than it was before.  She denies need for further treatment at this time.  She has been doing home exercises.      Allergies   Allergen Reactions    Doxycycline Hyclate Other (See Comments)     Throat pain    Oxycodone-Acetaminophen Hallucinations    Sulfa Antibiotics Rash    Tetracycline Other (See Comments)     Throat pain          Social History     Socioeconomic History    Marital status:    Tobacco Use    Smoking status: Never    Smokeless tobacco: Never   Vaping Use    Vaping Use: Never used   Substance and Sexual Activity    Alcohol use: Not Currently     Alcohol/week: 3.0 - 4.0 standard drinks     Types: 1 Glasses of wine, 1 Cans of beer, 1 - 2 Drinks containing 0.5 oz of alcohol per week     Comment: 1-2 drinks every few months    Drug use: Never    Sexual activity: Yes     Partners: Male     Birth control/protection: Surgical     Comment: Bilateral BTL and Ablation 2005        REVIEW OF SYSTEMS    Constitutional: Awake alert and oriented x3, no acute distress, denies fevers, chills, weight loss  Respiratory: No respiratory distress  Vascular: Brisk cap refill, Intact distal pulses, No cyanosis, compartments soft with no signs or symptoms of compartment syndrome or DVT.  "  Cardiovascular: Denies chest pain, shortness of breath  Skin: Denies rashes, acute skin changes  Neurologic: Denies headache, loss of consciousness  MSK: Right shoulder pain      Objective   Vital Signs:   Ht 157.5 cm (62\")   Wt 77.1 kg (170 lb)   BMI 31.09 kg/m²     Body mass index is 31.09 kg/m².    Physical Exam       Right shoulder- tender to the acromioclavicular joint, non-tender to the SC joint. Forward elevation 170. Abduction 150. External Rotation 90. Internal rotation 70. 5/5 supraspinatus strength. 5/5 infraspinatus and subscapularis. Internal rotation to L-4. Negative lift off. Equivocal O'julia. Pain with speeds testing. Pain with cross arm adduction          Procedures    Imaging Results (Most Recent)       None             Result Review :   The following data was reviewed by: BELLO Hyde on 10/06/2023:               Assessment and Plan    Diagnoses and all orders for this visit:    1. Right shoulder pain, unspecified chronicity (Primary)    2. Osteoarthritis of AC (acromioclavicular) joint    3. Biceps tendon tear        Discussed diagnosis and treatment options with the patient she was advised of future treatment options including injections which she states she will hold off on for now, we discussed she can follow-up at any time follow-up as needed    Call or return if worsening symptoms.    Follow Up   Return if symptoms worsen or fail to improve.  Patient was given instructions and counseling regarding her condition or for health maintenance advice. Please see specific information pulled into the AVS if appropriate.         "

## 2023-10-19 ENCOUNTER — OFFICE VISIT (OUTPATIENT)
Dept: CARDIOLOGY | Facility: CLINIC | Age: 63
End: 2023-10-19
Payer: OTHER GOVERNMENT

## 2023-10-19 VITALS
HEART RATE: 71 BPM | HEIGHT: 62 IN | DIASTOLIC BLOOD PRESSURE: 67 MMHG | SYSTOLIC BLOOD PRESSURE: 120 MMHG | BODY MASS INDEX: 31.1 KG/M2 | WEIGHT: 169 LBS

## 2023-10-19 DIAGNOSIS — I47.10 PSVT (PAROXYSMAL SUPRAVENTRICULAR TACHYCARDIA): Primary | ICD-10-CM

## 2023-10-19 DIAGNOSIS — E78.2 MIXED HYPERLIPIDEMIA: ICD-10-CM

## 2023-10-19 NOTE — PROGRESS NOTES
Chief Complaint  PSVT (paroxysmal supraventricular tachycardia), Fatigue, and Follow-up    Subjective        History of Present Illness  Cassia López presents to [facility]   Ms. López is a 63-year-old female patient coming in for cardiac follow-up.  She has a history of paroxysmal supraventricular tachycardia, and has intermittent episodes of palpitations, only worse when she is lying down.  Symptoms generally last less than a minute.  She had some mild shortness of breath, she attributes this somewhat to deconditioning with decreased activity over the last few years during the COVID pandemic.   Her biggest complaint recently has been persistent worsening fatigue, she is being set up to have sleep study to evaluate for sleep apnea.   Otherwise no hospitalizations, or significant health changes.        Past Medical History:   Diagnosis Date    Allergies     Anemia     Asthma 2019    Broken bones     Cataracts, bilateral     Depression 1995    Emotional depression     GERD (gastroesophageal reflux disease)     Head injury     Hyperlipidemia     Hypertension     Hypothyroidism     Low back pain 2004    Night sweats     SVT (supraventricular tachycardia)        Allergies   Allergen Reactions    Doxycycline Hyclate Other (See Comments)     Throat pain    Oxycodone-Acetaminophen Hallucinations    Sulfa Antibiotics Rash    Tetracycline Other (See Comments)     Throat pain          Past Surgical History:   Procedure Laterality Date    CARPAL TUNNEL RELEASE      COLON RESECTION      COLONOSCOPY  2017    COSMETIC SURGERY      ENDOMETRIAL ABLATION  15 Oct 2006    ENDOSCOPY  2017    EYE SURGERY  2006    Lasix    HERNIA REPAIR      TUBAL ABDOMINAL LIGATION  1995        Social History  She  reports that she has never smoked. She has never used smokeless tobacco. She reports that she does not currently use alcohol after a past usage of about 3.0 - 4.0 standard drinks of alcohol per week. She  reports that she does not use drugs.    Family History  Her family history includes Cancer in her brother, brother, mother, sister, and sister; Heart disease in her maternal aunt, maternal aunt, maternal uncle, maternal uncle, maternal uncle, and maternal uncle; Hyperlipidemia in her brother; Hypertension in her brother, father, mother, sister, and sister.       Current Outpatient Medications on File Prior to Visit   Medication Sig    albuterol sulfate HFA (ProAir HFA) 108 (90 Base) MCG/ACT inhaler Inhale 1 puff Every 4 (Four) Hours As Needed for Wheezing or Shortness of Air.    [place] Thyroid 30 MG tablet Take 1 tablet by mouth Daily.    aspirin 81 MG chewable tablet Chew 1 tablet Daily.    atenolol (Tenormin) 25 MG tablet Take 0.5 tablets by mouth 2 (Two) Times a Day.    Diclofenac Sodium (VOLTAREN) 1 % gel gel Apply 4 g topically to the appropriate area as directed 4 (Four) Times a Day As Needed (pain).    DULoxetine (CYMBALTA) 60 MG capsule Take 1 capsule by mouth Daily.    Estriol-Estradiol (BI-EST 50:50 TD) Bi-Est 1.25 prog/200GSRCA Oral Take 1 tablet once daily   Active    fluticasone (FLONASE) 50 MCG/ACT nasal spray 2 sprays into the nostril(s) as directed by provider Daily.    ibuprofen (ADVIL,MOTRIN) 800 MG tablet Take 1 tablet by mouth Every 6 (Six) Hours As Needed for Headache.    lidocaine (LIDODERM) 5 % Place 1 patch on the skin as directed by provider Daily.    meloxicam (MOBIC) 15 MG tablet Take 1 tablet by mouth Daily.    montelukast (SINGULAIR) 10 MG tablet Take 1 tablet by mouth Every Night.    olopatadine (Pataday) 0.1 % ophthalmic solution Administer 1 drop to both eyes 2 (Two) Times a Day.    ondansetron (ZOFRAN) 8 MG tablet Take 1 tablet by mouth Every 8 (Eight) Hours As Needed for Nausea or Vomiting.    pantoprazole (PROTONIX) 40 MG EC tablet Take 1 tablet by mouth 2 (Two) Times a Day.    rosuvastatin (Crestor) 40 MG tablet Take 1 tablet by mouth Every Night.     No current  "facility-administered medications on file prior to visit.         Review of Systems   Constitutional:  Positive for fatigue.   Respiratory:  Negative for cough, chest tightness and shortness of breath.    Cardiovascular:  Positive for palpitations. Negative for chest pain and leg swelling.   Gastrointestinal:  Negative for nausea and vomiting.   Neurological:  Negative for dizziness and syncope.   Hematological:  Does not bruise/bleed easily.        Objective   Vitals:    10/19/23 1255   BP: 120/67   Pulse: 71   Weight: 76.7 kg (169 lb)   Height: 157.5 cm (62\")         Physical Exam  General : Alert, awake, no acute distress  Neck : Supple, no carotid bruit, no jugular venous distention  CVS : Regular rate and rhythm, no murmur, rubs or gallops  Lungs: Clear to auscultation bilaterally, no crackles or rhonchi  Abdomen: Soft, nontender, bowel sounds active  Extremities: Warm, well-perfused, no pedal edema      Result Review     The following data was reviewed by KEANU Balderas  Labs from [facility] from October 11 reviewed-will be scanned to chart.      CMP          2/28/2023    04:17 9/14/2023    17:03   CMP   Glucose 92  101    BUN 24  20    Creatinine 1.00  0.97    EGFR 63.8  66.2    Sodium 143  141    Potassium 4.1  5.8    Chloride 108  77    Calcium 8.8  9.1    Total Protein  6.4    Albumin  4.0    Globulin  2.4    Total Bilirubin  0.2    Alkaline Phosphatase  52    AST (SGOT)  20    ALT (SGPT)  11    Albumin/Globulin Ratio  1.7    BUN/Creatinine Ratio 24.0  20.6    Anion Gap 8.0  37.0      CBC w/diff          2/28/2023    04:17 9/14/2023    17:03   CBC w/Diff   WBC 8.10  8.36    RBC 4.27  4.19    Hemoglobin 12.3  12.1    Hematocrit 37.7  37.2    MCV 88.3  88.8    MCH 28.8  28.9    MCHC 32.6  32.5    RDW 13.0  12.6    Platelets 170  176    Neutrophil Rel % 54.9  56.4    Immature Granulocyte Rel % 0.2  0.2    Lymphocyte Rel % 29.6  35.5    Monocyte Rel % 11.2  6.3    Eosinophil Rel % 3.7  1.4  " "  Basophil Rel % 0.4  0.2       Lab Results   Component Value Date    TSH 1.180 09/14/2023      No results found for: \"FREET4\"   D-Dimer, Quantitative   Date Value Ref Range Status   02/28/2023 0.33 0.00 - 0.62 MCGFEU/mL Final          Lipid Panel          9/14/2023    17:03   Lipid Panel   Total Cholesterol 127    Triglycerides 124    HDL Cholesterol 51    VLDL Cholesterol 22    LDL Cholesterol  54    LDL/HDL Ratio 1.00        Results for orders placed in visit on 02/24/22    Adult Transthoracic Echo Complete W/ Cont if Necessary Per Protocol    Interpretation Summary  · Left ventricular ejection fraction appears to be 61 - 65%. Left ventricular systolic function is normal.  · Left ventricular diastolic function was normal.  · There are no hemodynamically significant valvular abnormalities.  · Estimated right ventricular systolic pressure from tricuspid regurgitation is normal (<35 mmHg).      48 Holter monitor study done on 5/3/2022 showed     A relatively benign 48-hour Holter monitor study.  Underlying rhythm is normal sinus rhythm with an average heart rate of 64 bpm. 32% of the time was spent in bradycardia  Infrequent atrial premature complexes noted. There was a 4 beat run of supraventricular ectopy  There were no sustained atrial or ventricular arrhythmias.          Assessment and Plan   Diagnoses and all orders for this visit:    1. PSVT (paroxysmal supraventricular tachycardia) (Primary)  Assessment & Plan:  48-hour Holter monitor study last year showed isolated ectopic beats, without any arrhythmias.  She has mild symptoms of intermittent palpitations, we will continue her current dose of atenolol.      2. Mixed hyperlipidemia  Assessment & Plan:  Most recent LDL completed through [facility] is below goal at 48.  Continue current dose rosuvastatin 40 mg nightly.             Follow Up   Return in about 1 year (around 10/19/2024) for Next scheduled follow up, with Dr. Barksdale.    Patient was given " instructions and counseling regarding her condition or for health maintenance advice. Please see specific information pulled into the AVS if appropriate.     Signed,  Sulma Ramirez, APRN  10/19/2023     Dictated Utilizing Dragon Dictation: Please note that portions of this note were completed with a voice recognition program.  Part of this note may be an electronic transcription/translation of spoken language to printed text using the Dragon Dictation System.

## 2023-10-19 NOTE — ASSESSMENT & PLAN NOTE
Most recent LDL completed through [employer] is below goal at 48.  Continue current dose rosuvastatin 40 mg nightly.

## 2023-10-19 NOTE — ASSESSMENT & PLAN NOTE
48-hour Holter monitor study last year showed isolated ectopic beats, without any arrhythmias.  She has mild symptoms of intermittent palpitations, we will continue her current dose of atenolol.

## 2023-10-20 ENCOUNTER — OFFICE VISIT (OUTPATIENT)
Dept: INTERNAL MEDICINE | Facility: CLINIC | Age: 63
End: 2023-10-20
Payer: OTHER GOVERNMENT

## 2023-10-20 VITALS
HEIGHT: 62 IN | HEART RATE: 53 BPM | SYSTOLIC BLOOD PRESSURE: 109 MMHG | WEIGHT: 166.8 LBS | BODY MASS INDEX: 30.69 KG/M2 | TEMPERATURE: 97.3 F | OXYGEN SATURATION: 96 % | DIASTOLIC BLOOD PRESSURE: 70 MMHG

## 2023-10-20 DIAGNOSIS — N94.10 DYSPAREUNIA, FEMALE: Primary | ICD-10-CM

## 2023-10-20 PROCEDURE — 99213 OFFICE O/P EST LOW 20 MIN: CPT | Performed by: NURSE PRACTITIONER

## 2023-10-20 NOTE — PROGRESS NOTES
Chief Complaint  Bladder Prolapse (Pt states that when her and her  have intercourse,  says he can feel something poking him. Pt states she has a bladder sling and hernia mesh in place.)    Subjective          Cassia López presents to [facility]  History of Present Illness    Patient presents to the clinic today stating that when she and her  are having sex he states that he feels something and it is painful to him.   Patient denies pain, vaginal discharge, dysuria., abdominal tenderness.  States that she sees spots on her husbands penis and she wondered if her bladder mesh had fallen.     Current Outpatient Medications   Medication Instructions    albuterol sulfate HFA (ProAir HFA) 108 (90 Base) MCG/ACT inhaler 1 puff, Inhalation, Every 4 Hours PRN    [place] Thyroid 30 mg, Oral, Daily    aspirin 81 mg, Oral, Daily    atenolol (TENORMIN) 12.5 mg, Oral, 2 Times Daily    Diclofenac Sodium (VOLTAREN) 4 g, Topical, 4 Times Daily PRN    DULoxetine (CYMBALTA) 60 mg, Oral, Daily    Estriol-Estradiol (BI-EST 50:50 TD) Bi-Est 1.25 prog/200GSRCA Oral Take 1 tablet once daily   Active    fluticasone (FLONASE) 50 MCG/ACT nasal spray 2 sprays, Nasal, Daily    ibuprofen (ADVIL,MOTRIN) 800 mg, Oral, Every 6 Hours PRN    lidocaine (LIDODERM) 5 % 1 patch, Transdermal, Every 24 Hours    meloxicam (MOBIC) 15 mg, Oral, Daily    montelukast (SINGULAIR) 10 mg, Oral, Nightly    olopatadine (Pataday) 0.1 % ophthalmic solution 1 drop, Both Eyes, 2 Times Daily    ondansetron (ZOFRAN) 8 mg, Oral, Every 8 Hours PRN    pantoprazole (PROTONIX) 40 mg, Oral, 2 Times Daily    rosuvastatin (CRESTOR) 40 mg, Oral, Nightly       The following portions of the patient's history were reviewed and updated as appropriate: allergies, current medications, past family history, past medical history, past social history, past surgical history, and problem list.    Objective   Vital Signs:  "  /70 (BP Location: Left arm, Patient Position: Sitting, Cuff Size: Adult)   Pulse 53   Temp 97.3 °F (36.3 °C) (Temporal)   Ht 157.5 cm (62\")   Wt 75.7 kg (166 lb 12.8 oz)   SpO2 96%   BMI 30.51 kg/m²     BP Readings from Last 3 Encounters:   10/20/23 109/70   10/19/23 120/67   09/14/23 109/65     Wt Readings from Last 3 Encounters:   10/20/23 75.7 kg (166 lb 12.8 oz)   10/19/23 76.7 kg (169 lb)   10/06/23 77.1 kg (170 lb)           Physical Exam  Exam conducted with a chaperone present (ASIA Higgins & ASIA Saavedra).   Genitourinary:     General: Normal vulva.      Vagina: Normal. No signs of injury. No vaginal discharge, erythema, tenderness, bleeding, lesions or prolapsed vaginal walls.      Cervix: Normal. No cervical motion tenderness, discharge, friability, lesion, erythema, cervical bleeding or eversion.      Adnexa: Right adnexa normal and left adnexa normal.        Right: No tenderness.          Left: No tenderness.          No bladder prolapse noted. Unable to visualize mesh.       Appearance: No acute distress, well-nourished  Head: normocephalic, atraumatic  Eyes: extraocular movements intact, no scleral icterus, no conjunctival injection  Ears, Nose, and Throat: external ears normal, nares patent, moist mucous membranes  Cardiovascular: regular rate and rhythm. no murmurs, rubs, or gallops. no edema  Respiratory: breathing comfortably, symmetric chest rise, clear to auscultation bilaterally. No wheezes, rales, or rhonchi.  Neuro: alert and oriented to time, place, and person. Normal gait  Psych: normal mood and affect     Result Review :   The following data was reviewed by: KEANU Lino on 10/20/2023:  Common labs          2/28/2023    04:17 9/14/2023    17:03   Common Labs   Glucose 92  101    BUN 24  20    Creatinine 1.00  0.97    Sodium 143  141    Potassium 4.1  5.8    Chloride 108  77    Calcium 8.8  9.1    Albumin  4.0    Total Bilirubin  0.2    Alkaline Phosphatase  52    AST " (SGOT)  20    ALT (SGPT)  11    WBC 8.10  8.36    Hemoglobin 12.3  12.1    Hematocrit 37.7  37.2    Platelets 170  176    Total Cholesterol  127    Triglycerides  124    HDL Cholesterol  51    LDL Cholesterol   54             Lab Results   Component Value Date    COVID19 Not Detected 07/07/2021       Procedures        Assessment and Plan    Diagnoses and all orders for this visit:    1. Dyspareunia, female (Primary)  -     US Non-ob Transvaginal; Future      - vaginal exam reassuring.   - would like patient's  to make appt with Dr. CASTELLON to have penis examined to be sure there is not an abnormality on his end causing the pain.   - will obtain transvag to see everything is in place.       There are no discontinued medications.       Follow Up   Return if symptoms worsen or fail to improve.  Patient was given instructions and counseling regarding her condition or for health maintenance advice. Please see specific information pulled into the AVS if appropriate.       KEANU Lino  10/20/23  12:07 EDT

## 2023-11-01 ENCOUNTER — HOSPITAL ENCOUNTER (OUTPATIENT)
Dept: ULTRASOUND IMAGING | Facility: HOSPITAL | Age: 63
Discharge: HOME | End: 2023-11-01
Admitting: NURSE PRACTITIONER
Payer: OTHER GOVERNMENT

## 2023-11-01 DIAGNOSIS — N94.10 DYSPAREUNIA, FEMALE: ICD-10-CM

## 2023-11-01 PROCEDURE — 76830 TRANSVAGINAL US NON-OB: CPT

## 2023-11-08 ENCOUNTER — TELEPHONE (OUTPATIENT)
Dept: INTERNAL MEDICINE | Facility: CLINIC | Age: 63
End: 2023-11-08
Payer: OTHER GOVERNMENT

## 2023-11-08 DIAGNOSIS — N85.00 ENDOMETRIAL HYPERPLASIA: Primary | ICD-10-CM

## 2023-11-08 DIAGNOSIS — Z78.0 POST-MENOPAUSAL: ICD-10-CM

## 2023-11-08 NOTE — TELEPHONE ENCOUNTER
----- Message from KEANU Lino sent at 11/8/2023  1:49 PM EST -----  Thickening of endometrial lining noted. With her being postmenopausal needs GYN follow up. I have placed referral.

## 2023-11-15 ENCOUNTER — OFFICE VISIT (OUTPATIENT)
Dept: OBSTETRICS AND GYNECOLOGY | Facility: CLINIC | Age: 63
End: 2023-11-15
Payer: OTHER GOVERNMENT

## 2023-11-15 VITALS
BODY MASS INDEX: 31.28 KG/M2 | SYSTOLIC BLOOD PRESSURE: 116 MMHG | HEIGHT: 62 IN | WEIGHT: 170 LBS | HEART RATE: 97 BPM | DIASTOLIC BLOOD PRESSURE: 67 MMHG

## 2023-11-15 DIAGNOSIS — R93.89 THICKENED ENDOMETRIUM: Primary | ICD-10-CM

## 2023-11-15 DIAGNOSIS — N95.0 PMB (POSTMENOPAUSAL BLEEDING): ICD-10-CM

## 2023-11-15 RX ORDER — SODIUM CHLORIDE 0.9 % (FLUSH) 0.9 %
10 SYRINGE (ML) INJECTION AS NEEDED
OUTPATIENT
Start: 2023-11-15

## 2023-11-15 RX ORDER — OLOPATADINE HYDROCHLORIDE 1 MG/ML
1 SOLUTION/ DROPS OPHTHALMIC 2 TIMES DAILY
Qty: 15 ML | Refills: 3 | Status: SHIPPED | OUTPATIENT
Start: 2023-11-15

## 2023-11-15 RX ORDER — ONDANSETRON HYDROCHLORIDE 8 MG/1
8 TABLET, FILM COATED ORAL EVERY 8 HOURS PRN
Qty: 20 TABLET | Refills: 0 | Status: SHIPPED | OUTPATIENT
Start: 2023-11-15

## 2023-11-15 RX ORDER — SODIUM CHLORIDE 0.9 % (FLUSH) 0.9 %
3 SYRINGE (ML) INJECTION EVERY 12 HOURS SCHEDULED
OUTPATIENT
Start: 2023-11-15

## 2023-11-15 RX ORDER — SODIUM CHLORIDE 9 MG/ML
40 INJECTION, SOLUTION INTRAVENOUS AS NEEDED
OUTPATIENT
Start: 2023-11-15

## 2023-11-15 RX ORDER — PANTOPRAZOLE SODIUM 40 MG/1
40 TABLET, DELAYED RELEASE ORAL 2 TIMES DAILY
Qty: 180 TABLET | Refills: 1 | Status: SHIPPED | OUTPATIENT
Start: 2023-11-15

## 2023-11-15 RX ORDER — MELOXICAM 15 MG/1
15 TABLET ORAL DAILY
Qty: 90 TABLET | Refills: 1 | Status: SHIPPED | OUTPATIENT
Start: 2023-11-15

## 2023-11-15 RX ORDER — UREA 10 %
LOTION (ML) TOPICAL
COMMUNITY

## 2023-11-15 NOTE — PROGRESS NOTES
"NEW GYN Visit    Chief Complaint   Patient presents with    Follow-up     Thick endometrial lining 8mm       HPI:   63 y.o. No LMP recorded. Patient is postmenopausal.  Patient presents to discuss pelvic ultrasound showing thickened endometrium.  Patient states partner felt something during intercourse.  She went to see her PCP.  They did a pelvic exam and did not find anything concerning.  They decided to order a pelvic ultrasound to evaluate.  Ultrasound showed a thickened endometrium.  Patient denies any bleeding.  Patient does not feel like she has a bulge or a prolapsed uterus.  She states she has a history of an anterior posterior repair and bladder sling.    Social History     Substance and Sexual Activity   Sexual Activity Yes    Partners: Male    Birth control/protection: Surgical    Comment: Bilateral BTL and Ablation            History: PMHx, Meds, Allergies, PSHx, Social Hx, and POBHx all reviewed and updated.    PHYSICAL EXAM:  /67   Pulse 97   Ht 157.5 cm (62\")   Wt 77.1 kg (170 lb)   BMI 31.09 kg/m²   General- NAD, alert and oriented, appropriate  Psych- Normal mood  Cardiovascular- Regular rate and rhythm  Respiratory- No labored breathing  Abdomen- Soft, non distended, non tender, no masses  Lymphatic- No palpable groin nodes  Extremities- No edema, no cyanosis    Skin- No lesions, no rashes      ASSESSMENT AND PLAN:  Diagnoses and all orders for this visit:    1. Thickened endometrium (Primary)  -     Case Request; Standing  -     Follow Anesthesia Guidelines / Protocol; Future  -     Chlorhexidine Skin Prep; Future  -     Case Request    2. PMB (postmenopausal bleeding)  -     sodium chloride 0.9 % flush 3 mL  -     sodium chloride 0.9 % flush 10 mL  -     sodium chloride 0.9 % infusion 40 mL    Other orders  -     Code Status and Medical Interventions:; Standing  -     Follow Anesthesia Guidelines / Protocol; Standing  -     Obtain Informed Consent; Standing  -     Verify / " Perform Chlorhexidine Skin Prep; Standing  -     CBC & Differential; Standing  -     Insert Peripheral IV; Standing  -     Saline Lock & Maintain IV Access; Standing    Discussed ultrasound findings with the patient.  Endometrial stripe measures 8 mm.  Discussed with patient that we expect the postmenopausal endometrium to be 4 mm or less.  With the finding of thickened endometrium I recommend endometrial sampling.  We discussed endometrial biopsy versus hysteroscopy D&C.  I recommend hysteroscopy D&C for a directed biopsy.  Patient is agreeable expressed understanding.  Risks and benefits and alternatives of surgery were discussed with patient.  Risks are not limited to anesthesia, bleeding, blood transfusion, infection, damage to surrounding organs, wound separation, re-operation, thromboembolic disease, death.          Follow Up:  Return in about 2 weeks (around 11/29/2023) for Post op visit.        Macy Sloan DO  11/15/2023    [facility]  [address] [place] [address]  Dept: [phone]  Dept Fax: [phone]  Loc: [phone]  Loc Fax: [phone]

## 2023-11-17 ENCOUNTER — PATIENT ROUNDING (BHMG ONLY) (OUTPATIENT)
Dept: OBSTETRICS AND GYNECOLOGY | Facility: CLINIC | Age: 63
End: 2023-11-17
Payer: OTHER GOVERNMENT

## 2023-11-17 NOTE — PROGRESS NOTES
A My-Chart message has been sent to the patient for [place] [facility]

## 2023-12-26 NOTE — PRE-PROCEDURE INSTRUCTIONS
PATIENT INSTRUCTED TO BE:    - NOTHING TO EAT AFTER MIDNIGHT OR CHEW, EXCEPT CAN HAVE CLEAR LIQUIDS 2 HOURS PRIOR TO SURGERY ARRIVAL TIME     - TO HOLD ALL VITAMINS, SUPPLEMENTS, NSAIDS FOR ONE WEEK PRIOR TO THEIR SURGICAL PROCEDURE    - DO NOT TAKE ___-----------_ 7 DAYS PRIOR TO PROCEDURE PER ANESTHESIA RECOMMENDATIONS/INSTRUCTIONS     - INSTRUCTED PT TO USE SURGICAL SOAP 1 TIME THE NIGHT PRIOR TO SURGERY OR THE AM OF SURGERY.   USE SOAP FROM NECK TO TOES AVOID THEIR FACE, HAIR, AND PRIVATE PARTS. INSTRUCTED NO LOTIONS, JEWELRY, PIERCINGS, OR DEODORANT DAY OF SURGERY    - IF DIABETIC, CHECK BLOOD GLUCOSE IF LESS THAN 70 OR HAVING SYMPTOMS CALL THE PREOP AREA FOR INSTRUCTIONS ON AM OF SURGERY ([phone])    -INSTRUCTED TO TAKE THE FOLLOWING MEDICATIONS THE DAY OF SURGERY:            INHALES PRN, ARMOUR THYROID, ATENOLOL, CYMBALTA, FLONASE PRN, LIDOCAINE PATCH PRN, PATADAY EYE DROPS, ZOFRAN PRN, PROTONIX       - DO NOT BRING ANY MEDICATIONS WITH YOU TO THE HOSPITAL THE DAY OF SURGERY, EXCEPT IF USE INHALERS. BRING INHALERS DAY OF SURGERY       - BRING CPAP OR BIPAP TO THE HOSPITAL ONLY IF ARE SPENDING THE NIGHT    - DO NOT SMOKE OR VAPE 24 HOURS PRIOR TO PROCEDURE PER ANESTHESIA REQUEST     -MAKE SURE YOU HAVE A RIDE HOME OR SOMEONE TO STAY WITH YOU THE DAY OF THE PROCEDURE AFTER YOU GO HOME    - FOLLOW ANY OTHER INSTRUCTIONS GIVEN TO YOU BY YOUR SURGEON'S OFFICE.     - PREADMISSION TESTING NURSE- ALMA MONTEMAYOR [phone] IF HAVE ANY QUESTIONS     PATIENT PROVIDED THE NUMBER FOR PREOP SURGICAL DEPT IF HAD QUESTIONS AFTER HOURS PRIOR TO SURGERY ([phone] )  INFORMED PT IF NO ANSWER, LEAVE A MESSAGE AND SOMEONE WILL RETURN THEIR CALL       PATIENT VERBALIZED UNDERSTANDING

## 2023-12-27 NOTE — H&P
GYN HISTORY & PHYSICAL      Patient Name: Cassia López  : 1960  MRN: [id number]    Subjective     Chief Complaint: Thickened endometrium    HPI:  63 y.o.  No LMP recorded. (Menstrual status: Tubal ligation).. Patient here today to undergo hysteroscopy D&C possible MyoSure.  Patient was having pain with intercourse.  Pelvic ultrasound was performed and a thickened endometrium of 8 mm was noted.    She wishes to proceed with the above procedure. She denies any F/C, D/C, N/V, CP or SOB.     Risks and benefits and alternatives of surgery were discussed with patient.  Risks are not limited to anesthesia, bleeding, blood transfusion, infection, damage to surrounding organs, wound separation, re-operation, thromboembolic disease, death.            ROS: Review of Systems      Personal History     PMHx:    Past Medical History:   Diagnosis Date    Allergies     Anemia     Asthma 2019    INHALERS PRN    Broken bones     Cataracts, bilateral     Depression     Treated    Emotional depression     GERD (gastroesophageal reflux disease)     Head injury     NO RESIDUAL    Hyperlipidemia     Hypertension     Hypothyroidism     Low back pain     Night sweats     PONV (postoperative nausea and vomiting)     SVT (supraventricular tachycardia)     ASYMPTOMATIC- SEE'S DR. ROSE/ BENITO CAPONE NP, DENIED CP-SOB       OBHx:   OB History    Para Term  AB Living   4 2 2   2 2   SAB IAB Ectopic Molar Multiple Live Births   2         2      # Outcome Date GA Lbr Adalid/2nd Weight Sex Delivery Anes PTL Lv   4 Term  40w0d   F Vag-Spont   AISHWARYA   3 Term  40w0d   M Vag-Spont   AISHWARYA   2 SAB 1977           1 SAB                 Home Medications:  DULoxetine, Diclofenac Sodium, Thyroid, albuterol sulfate HFA, aspirin, atenolol, fluticasone, ibuprofen, lidocaine, melatonin, meloxicam, montelukast, olopatadine, ondansetron, pantoprazole, and rosuvastatin    Allergies:  Allergies   Allergen Reactions     Doxycycline Hyclate Other (See Comments)     Throat pain    Oxycodone-Acetaminophen Hallucinations    Sulfa Antibiotics Rash    Tetracycline Other (See Comments)     Throat pain         PSHx:   Past Surgical History:   Procedure Laterality Date    CARPAL TUNNEL RELEASE      COLON RESECTION      AP/POSTERIOR BLADDER REPAIR    COLONOSCOPY  2017    COSMETIC SURGERY      D & C HYSTEROSCOPY      ENDOMETRIAL ABLATION  15 Oct 2006    ENDOSCOPY  2017    EYE SURGERY  2006    Lasix    HERNIA REPAIR      TUBAL ABDOMINAL LIGATION  1995       Social History:    reports that she has never smoked. She has never used smokeless tobacco. She reports that she does not currently use alcohol after a past usage of about 3.0 - 4.0 standard drinks of alcohol per week. She reports that she does not use drugs.      Objective     Vitals:        PHYSICAL EXAM:   General- NAD, alert and oriented, appropriate  Psych- Normal mood, good memory  CV- Regular rhythm, no murnurs  Resp- CTA to bases, no wheezes  Abdomen- NABS, soft, non distended, non tender, no masses  Pelvic- Defer to OR     Lab/Imaging/Other:      Assessment / Plan     Assessment:  Thickened endometrium     Plan:   Hysteroscopy D&C possible MyoSure  Patient to follow up in office in 2 weeks for post op visit. All questions and concerns have been answered.       Counseling: Risks and benefits and alternatives of surgery were discussed with patient.  Risks are not limited to anesthesia, bleeding, blood transfusion, infection, damage to surrounding organs, wound separation, re-operation, thromboembolic disease, death.  See separate written and signed consent for surgical specific risks and benefits.        Signature:   Electronically signed by:    Macy Sloan DO  12/27/23  07:48 EST

## 2023-12-29 ENCOUNTER — HOSPITAL ENCOUNTER (OUTPATIENT)
Facility: HOSPITAL | Age: 63
Setting detail: HOSPITAL OUTPATIENT SURGERY
Discharge: HOME | End: 2023-12-29
Attending: STUDENT IN AN ORGANIZED HEALTH CARE EDUCATION/TRAINING PROGRAM | Admitting: STUDENT IN AN ORGANIZED HEALTH CARE EDUCATION/TRAINING PROGRAM
Payer: OTHER GOVERNMENT

## 2023-12-29 ENCOUNTER — ANESTHESIA (OUTPATIENT)
Dept: PERIOP | Facility: HOSPITAL | Age: 63
End: 2023-12-29
Payer: OTHER GOVERNMENT

## 2023-12-29 ENCOUNTER — ANESTHESIA EVENT (OUTPATIENT)
Dept: PERIOP | Facility: HOSPITAL | Age: 63
End: 2023-12-29
Payer: OTHER GOVERNMENT

## 2023-12-29 VITALS
RESPIRATION RATE: 18 BRPM | TEMPERATURE: 97.9 F | OXYGEN SATURATION: 96 % | WEIGHT: 169.31 LBS | BODY MASS INDEX: 31.16 KG/M2 | HEART RATE: 61 BPM | HEIGHT: 62 IN | SYSTOLIC BLOOD PRESSURE: 117 MMHG | DIASTOLIC BLOOD PRESSURE: 75 MMHG

## 2023-12-29 DIAGNOSIS — N95.0 PMB (POSTMENOPAUSAL BLEEDING): ICD-10-CM

## 2023-12-29 LAB
BASOPHILS # BLD AUTO: 0.04 10*3/MM3 (ref 0–0.2)
BASOPHILS NFR BLD AUTO: 0.5 % (ref 0–1.5)
DEPRECATED RDW RBC AUTO: 40.7 FL (ref 37–54)
EOSINOPHIL # BLD AUTO: 0.23 10*3/MM3 (ref 0–0.4)
EOSINOPHIL NFR BLD AUTO: 2.6 % (ref 0.3–6.2)
ERYTHROCYTE [DISTWIDTH] IN BLOOD BY AUTOMATED COUNT: 12.4 % (ref 12.3–15.4)
HCT VFR BLD AUTO: 43.4 % (ref 34–46.6)
HGB BLD-MCNC: 13.9 G/DL (ref 12–15.9)
IMM GRANULOCYTES # BLD AUTO: 0.02 10*3/MM3 (ref 0–0.05)
IMM GRANULOCYTES NFR BLD AUTO: 0.2 % (ref 0–0.5)
LYMPHOCYTES # BLD AUTO: 3.05 10*3/MM3 (ref 0.7–3.1)
LYMPHOCYTES NFR BLD AUTO: 34.9 % (ref 19.6–45.3)
MCH RBC QN AUTO: 28.6 PG (ref 26.6–33)
MCHC RBC AUTO-ENTMCNC: 32 G/DL (ref 31.5–35.7)
MCV RBC AUTO: 89.3 FL (ref 79–97)
MONOCYTES # BLD AUTO: 0.63 10*3/MM3 (ref 0.1–0.9)
MONOCYTES NFR BLD AUTO: 7.2 % (ref 5–12)
NEUTROPHILS NFR BLD AUTO: 4.76 10*3/MM3 (ref 1.7–7)
NEUTROPHILS NFR BLD AUTO: 54.6 % (ref 42.7–76)
NRBC BLD AUTO-RTO: 0 /100 WBC (ref 0–0.2)
PLATELET # BLD AUTO: 215 10*3/MM3 (ref 140–450)
PMV BLD AUTO: 10.6 FL (ref 6–12)
POTASSIUM SERPL-SCNC: 4.7 MMOL/L (ref 3.5–5.2)
RBC # BLD AUTO: 4.86 10*6/MM3 (ref 3.77–5.28)
WBC NRBC COR # BLD AUTO: 8.73 10*3/MM3 (ref 3.4–10.8)

## 2023-12-29 PROCEDURE — 25010000002 PROPOFOL 10 MG/ML EMULSION: Performed by: NURSE ANESTHETIST, CERTIFIED REGISTERED

## 2023-12-29 PROCEDURE — 25810000003 LACTATED RINGERS PER 1000 ML: Performed by: ANESTHESIOLOGY

## 2023-12-29 PROCEDURE — 88305 TISSUE EXAM BY PATHOLOGIST: CPT | Performed by: STUDENT IN AN ORGANIZED HEALTH CARE EDUCATION/TRAINING PROGRAM

## 2023-12-29 PROCEDURE — 25010000002 MIDAZOLAM PER 1MG: Performed by: ANESTHESIOLOGY

## 2023-12-29 PROCEDURE — 58558 HYSTEROSCOPY BIOPSY: CPT | Performed by: STUDENT IN AN ORGANIZED HEALTH CARE EDUCATION/TRAINING PROGRAM

## 2023-12-29 PROCEDURE — 25010000002 ONDANSETRON PER 1 MG: Performed by: NURSE ANESTHETIST, CERTIFIED REGISTERED

## 2023-12-29 PROCEDURE — 25010000002 DEXAMETHASONE PER 1 MG: Performed by: NURSE ANESTHETIST, CERTIFIED REGISTERED

## 2023-12-29 PROCEDURE — 84132 ASSAY OF SERUM POTASSIUM: CPT | Performed by: ANESTHESIOLOGY

## 2023-12-29 PROCEDURE — 25010000002 FENTANYL CITRATE (PF) 50 MCG/ML SOLUTION: Performed by: NURSE ANESTHETIST, CERTIFIED REGISTERED

## 2023-12-29 PROCEDURE — 25010000002 KETOROLAC TROMETHAMINE PER 15 MG: Performed by: NURSE ANESTHETIST, CERTIFIED REGISTERED

## 2023-12-29 PROCEDURE — 85025 COMPLETE CBC W/AUTO DIFF WBC: CPT | Performed by: STUDENT IN AN ORGANIZED HEALTH CARE EDUCATION/TRAINING PROGRAM

## 2023-12-29 RX ORDER — PHENYLEPHRINE HCL IN 0.9% NACL 1 MG/10 ML
SYRINGE (ML) INTRAVENOUS AS NEEDED
Status: DISCONTINUED | OUTPATIENT
Start: 2023-12-29 | End: 2023-12-29 | Stop reason: SURG

## 2023-12-29 RX ORDER — SCOLOPAMINE TRANSDERMAL SYSTEM 1 MG/1
1 PATCH, EXTENDED RELEASE TRANSDERMAL ONCE
Status: DISCONTINUED | OUTPATIENT
Start: 2023-12-29 | End: 2023-12-29 | Stop reason: HOSPADM

## 2023-12-29 RX ORDER — DEXAMETHASONE SODIUM PHOSPHATE 4 MG/ML
INJECTION, SOLUTION INTRA-ARTICULAR; INTRALESIONAL; INTRAMUSCULAR; INTRAVENOUS; SOFT TISSUE AS NEEDED
Status: DISCONTINUED | OUTPATIENT
Start: 2023-12-29 | End: 2023-12-29 | Stop reason: SURG

## 2023-12-29 RX ORDER — MAGNESIUM HYDROXIDE 1200 MG/15ML
LIQUID ORAL AS NEEDED
Status: DISCONTINUED | OUTPATIENT
Start: 2023-12-29 | End: 2023-12-29 | Stop reason: HOSPADM

## 2023-12-29 RX ORDER — ONDANSETRON 2 MG/ML
4 INJECTION INTRAMUSCULAR; INTRAVENOUS ONCE AS NEEDED
Status: DISCONTINUED | OUTPATIENT
Start: 2023-12-29 | End: 2023-12-29 | Stop reason: HOSPADM

## 2023-12-29 RX ORDER — SODIUM CHLORIDE 9 MG/ML
40 INJECTION, SOLUTION INTRAVENOUS AS NEEDED
Status: DISCONTINUED | OUTPATIENT
Start: 2023-12-29 | End: 2023-12-29 | Stop reason: HOSPADM

## 2023-12-29 RX ORDER — PROMETHAZINE HYDROCHLORIDE 12.5 MG/1
25 TABLET ORAL ONCE AS NEEDED
Status: DISCONTINUED | OUTPATIENT
Start: 2023-12-29 | End: 2023-12-29 | Stop reason: HOSPADM

## 2023-12-29 RX ORDER — LIDOCAINE HYDROCHLORIDE 20 MG/ML
INJECTION, SOLUTION EPIDURAL; INFILTRATION; INTRACAUDAL; PERINEURAL AS NEEDED
Status: DISCONTINUED | OUTPATIENT
Start: 2023-12-29 | End: 2023-12-29 | Stop reason: SURG

## 2023-12-29 RX ORDER — MEPERIDINE HYDROCHLORIDE 25 MG/ML
12.5 INJECTION INTRAMUSCULAR; INTRAVENOUS; SUBCUTANEOUS
Status: DISCONTINUED | OUTPATIENT
Start: 2023-12-29 | End: 2023-12-29 | Stop reason: HOSPADM

## 2023-12-29 RX ORDER — GLYCOPYRROLATE 0.2 MG/ML
INJECTION INTRAMUSCULAR; INTRAVENOUS AS NEEDED
Status: DISCONTINUED | OUTPATIENT
Start: 2023-12-29 | End: 2023-12-29 | Stop reason: SURG

## 2023-12-29 RX ORDER — SODIUM CHLORIDE, SODIUM LACTATE, POTASSIUM CHLORIDE, CALCIUM CHLORIDE 600; 310; 30; 20 MG/100ML; MG/100ML; MG/100ML; MG/100ML
9 INJECTION, SOLUTION INTRAVENOUS CONTINUOUS PRN
Status: DISCONTINUED | OUTPATIENT
Start: 2023-12-29 | End: 2023-12-29 | Stop reason: HOSPADM

## 2023-12-29 RX ORDER — SODIUM CHLORIDE 0.9 % (FLUSH) 0.9 %
10 SYRINGE (ML) INJECTION AS NEEDED
Status: DISCONTINUED | OUTPATIENT
Start: 2023-12-29 | End: 2023-12-29 | Stop reason: HOSPADM

## 2023-12-29 RX ORDER — PROPOFOL 10 MG/ML
VIAL (ML) INTRAVENOUS AS NEEDED
Status: DISCONTINUED | OUTPATIENT
Start: 2023-12-29 | End: 2023-12-29 | Stop reason: SURG

## 2023-12-29 RX ORDER — SODIUM CHLORIDE 0.9 % (FLUSH) 0.9 %
3 SYRINGE (ML) INJECTION EVERY 12 HOURS SCHEDULED
Status: DISCONTINUED | OUTPATIENT
Start: 2023-12-29 | End: 2023-12-29 | Stop reason: HOSPADM

## 2023-12-29 RX ORDER — PROMETHAZINE HYDROCHLORIDE 25 MG/1
25 SUPPOSITORY RECTAL ONCE AS NEEDED
Status: DISCONTINUED | OUTPATIENT
Start: 2023-12-29 | End: 2023-12-29 | Stop reason: HOSPADM

## 2023-12-29 RX ORDER — FENTANYL CITRATE 50 UG/ML
INJECTION, SOLUTION INTRAMUSCULAR; INTRAVENOUS AS NEEDED
Status: DISCONTINUED | OUTPATIENT
Start: 2023-12-29 | End: 2023-12-29 | Stop reason: SURG

## 2023-12-29 RX ORDER — KETOROLAC TROMETHAMINE 30 MG/ML
INJECTION, SOLUTION INTRAMUSCULAR; INTRAVENOUS AS NEEDED
Status: DISCONTINUED | OUTPATIENT
Start: 2023-12-29 | End: 2023-12-29 | Stop reason: SURG

## 2023-12-29 RX ORDER — MIDAZOLAM HYDROCHLORIDE 2 MG/2ML
2 INJECTION, SOLUTION INTRAMUSCULAR; INTRAVENOUS ONCE
Status: COMPLETED | OUTPATIENT
Start: 2023-12-29 | End: 2023-12-29

## 2023-12-29 RX ORDER — ONDANSETRON 2 MG/ML
INJECTION INTRAMUSCULAR; INTRAVENOUS AS NEEDED
Status: DISCONTINUED | OUTPATIENT
Start: 2023-12-29 | End: 2023-12-29 | Stop reason: SURG

## 2023-12-29 RX ADMIN — LIDOCAINE HYDROCHLORIDE 40 MG: 20 INJECTION, SOLUTION EPIDURAL; INFILTRATION; INTRACAUDAL; PERINEURAL at 13:04

## 2023-12-29 RX ADMIN — PROPOFOL 150 MCG/KG/MIN: 10 INJECTION, EMULSION INTRAVENOUS at 13:07

## 2023-12-29 RX ADMIN — FENTANYL CITRATE 25 MCG: 50 INJECTION, SOLUTION INTRAMUSCULAR; INTRAVENOUS at 13:26

## 2023-12-29 RX ADMIN — SCOPALAMINE 1 PATCH: 1 PATCH, EXTENDED RELEASE TRANSDERMAL at 11:42

## 2023-12-29 RX ADMIN — MIDAZOLAM HYDROCHLORIDE 2 MG: 1 INJECTION, SOLUTION INTRAMUSCULAR; INTRAVENOUS at 12:51

## 2023-12-29 RX ADMIN — SODIUM CHLORIDE, POTASSIUM CHLORIDE, SODIUM LACTATE AND CALCIUM CHLORIDE 9 ML/HR: 600; 310; 30; 20 INJECTION, SOLUTION INTRAVENOUS at 11:41

## 2023-12-29 RX ADMIN — PROPOFOL 100 MG: 10 INJECTION, EMULSION INTRAVENOUS at 13:04

## 2023-12-29 RX ADMIN — GLYCOPYRROLATE 0.2 MG: 0.2 INJECTION INTRAMUSCULAR; INTRAVENOUS at 13:24

## 2023-12-29 RX ADMIN — DEXAMETHASONE SODIUM PHOSPHATE 4 MG: 4 INJECTION, SOLUTION INTRAMUSCULAR; INTRAVENOUS at 13:11

## 2023-12-29 RX ADMIN — KETOROLAC TROMETHAMINE 15 MG: 30 INJECTION, SOLUTION INTRAMUSCULAR; INTRAVENOUS at 13:27

## 2023-12-29 RX ADMIN — FENTANYL CITRATE 50 MCG: 50 INJECTION, SOLUTION INTRAMUSCULAR; INTRAVENOUS at 13:13

## 2023-12-29 RX ADMIN — SODIUM CHLORIDE, POTASSIUM CHLORIDE, SODIUM LACTATE AND CALCIUM CHLORIDE: 600; 310; 30; 20 INJECTION, SOLUTION INTRAVENOUS at 13:25

## 2023-12-29 RX ADMIN — Medication 100 MCG: at 13:07

## 2023-12-29 RX ADMIN — Medication 50 MCG: at 13:14

## 2023-12-29 RX ADMIN — FENTANYL CITRATE 25 MCG: 50 INJECTION, SOLUTION INTRAMUSCULAR; INTRAVENOUS at 13:04

## 2023-12-29 RX ADMIN — ONDANSETRON 4 MG: 2 INJECTION INTRAMUSCULAR; INTRAVENOUS at 13:11

## 2023-12-29 NOTE — OP NOTE
DILATATION AND CURETTAGE HYSTEROSCOPY  Procedure Report    Patient Name:  Cassia López  YOB: 1960    Date of Surgery:  12/29/2023     Indications: Dyspareunia, thickened endometrium, postmenopausal bleeding    Pre-op Diagnosis:   PMB (postmenopausal bleeding) [N95.0]       Post-Op Diagnosis Codes:     * PMB (postmenopausal bleeding) [N95.0]    Procedure/CPT® Codes:      Procedure(s):  DILATATION AND CURETTAGE HYSTEROSCOPY        Staff:  Surgeon(s):  Macy Sloan DO         Anesthesia: General    Estimated Blood Loss: minimal    Implants:    Nothing was implanted during the procedure    Specimen:          Specimens       ID Source Type Tests Collected By Collected At Frozen?    A Endometrial Curettings Tissue TISSUE PATHOLOGY EXAM   Macy Sloan DO 12/29/23 1321 No    Description: Endometrial Curettings    This specimen was not marked as sent.                Findings: Scarred appearing uterine cavity, stenotic cervix    Complications: None    Description of Procedure: The patient was taken to the operating room where a time out was performed to confirm correct patient and correct procedure. The patient was placed under general anesthesia. The patient was then positioned on the operating table in the dorsal lithotomy position with legs supported using stirrups. The patient was then prepped and draped in the usual sterile fashion. A straight catheter was to drain the bladder. A weighted speculum was then inserted into the vagina. Using a right angle retractor, the anterior lip of the cervix was visualized and grasped using a single-tooth tenaculum. The cervix was then adequately dilated using Hegar dilators. The  hysteroscope was introduced under direct visualization using normal saline solution as the  distending media. The hysteroscope was advanced to the fundus and the entire uterine cavity  was inspected.  Dense scar tissue noted likely from previous ablation.  No obvious thickening or polyps  noted. The ostia were not visualized bilaterally. The hysteroscope was then removed  and a sharp curetting was performed. This was done by starting at the 12 o'clock position and  rotating a total of 360 degrees in order to cover all surfaces. Endometrial tissue was obtained  and was sent to pathology. Following the curetting, good hemostasis was noted. The  single-tooth tenaculum was removed from the anterior lip of the cervix and hemostasis was also  noted at the tenaculum puncture site. The weighted speculum was then removed from the  vagina. Sponge count was noted to be correct x2. The patient tolerated the procedure well and was  transferred to the recovery room in stable condition.     Macy Sloan,      Date: 12/29/2023  Time: 13:36 EST

## 2023-12-29 NOTE — ANESTHESIA PREPROCEDURE EVALUATION
Anesthesia Evaluation     Patient summary reviewed and Nursing notes reviewed   history of anesthetic complications:  PONV               Airway   Mallampati: I  TM distance: >3 FB  Neck ROM: full  No difficulty expected  Dental      Pulmonary - normal exam    breath sounds clear to auscultation  (+) asthma,  Cardiovascular - normal exam    Rhythm: regular  Rate: normal    (+) hypertension, dysrhythmias Paroxysmal Atrial Fib, hyperlipidemia      Neuro/Psych  (+) psychiatric history Depression  GI/Hepatic/Renal/Endo    (+) GERD, thyroid problem hypothyroidism    Musculoskeletal     Abdominal    Substance History - negative use     OB/GYN negative ob/gyn ROS         Other   arthritis,                 Anesthesia Plan    ASA 3     general     intravenous induction     Anesthetic plan, risks, benefits, and alternatives have been provided, discussed and informed consent has been obtained with: patient.    CODE STATUS:

## 2023-12-29 NOTE — DISCHARGE INSTRUCTIONS
DISCHARGE INSTRUCTIONS  GYNECOLOGICAL  PROCEDURES        For your surgery you had:  General anesthesia (you may have a sore throat for the first 24 hours)  You may experience dizziness, drowsiness, or lightheadedness for several hours following surgery.  Do not stay alone today or tonight.  Limit your activity for 24 hours.  Resume your diet slowly.  Follow any special dietary instructions you may have been given by your doctor.  You should not drive or operate machinery, drink alcohol, or sign legally binding documents for 24 hours or while you are taking pain medication.     NOTIFY YOUR DOCTOR IF YOU EXPERIENCE ANY OF THE FOLLOWING:  Temperature greater than 101 degrees Fahrenheit  Shaking Chills  Redness or excessive drainage from incision  Nausea, vomiting and/or pain that is not controlled by prescribed medications  Increase in bleeding or bleeding that is excessive  Unable to urinate in 6 hours after surgery  If unable to reach your doctor, please go to the closest Emergency Room [x]  Change jarrod-pad as needed.     [x]  Nothing in the vagina for 2 weeks to include intercourse, douches, or tampons.  [x]  You may resume intercourse and the use of tampons as your physician has instructed you.        Vaginal bleeding may be expected for several days with flow decreasing with time and never any heavier than a normal  period.  If you have an ablation, vaginal discharge is expected after bleeding stops.   If you have foul smelling discharge, notify your physician.  Medications per physician instructions as indicated on After Visit Summary.     Last dose of pain medication was given at:   Toradol given at 1:27PM May take ibuprofen after 7:27PM    SPECIAL INSTRUCTIONS:   Remove nausea medication patch behind ear tomorrow. Wash hands after removal.

## 2023-12-29 NOTE — ANESTHESIA POSTPROCEDURE EVALUATION
Patient: Cassia López    Procedure Summary       Date: 12/29/23 Room / Location: [facility] OR 05 / [facility] MAIN OR    Anesthesia Start: 1301 Anesthesia Stop: 1337    Procedure: DILATATION AND CURETTAGE HYSTEROSCOPY (Uterus) Diagnosis:       PMB (postmenopausal bleeding)      (PMB (postmenopausal bleeding) [N95.0])    Surgeons: Macy Sloan DO Provider: Zain Dempsey MD    Anesthesia Type: general ASA Status: 3            Anesthesia Type: general    Vitals  Vitals Value Taken Time   /61 12/29/23 1336   Temp     Pulse 79 12/29/23 1336   Resp     SpO2 95 % 12/29/23 1336   Vitals shown include unfiled device data.        Post Anesthesia Care and Evaluation    Patient location during evaluation: PACU  Level of consciousness: responsive to painful stimuli    Airway patency: patent  Anesthetic complications: No anesthetic complications    Cardiovascular status: acceptable  Respiratory status: acceptable  Hydration status: acceptable  No anesthesia care post op

## 2024-01-03 LAB
CYTO UR: NORMAL
LAB AP CASE REPORT: NORMAL
LAB AP CLINICAL INFORMATION: NORMAL
PATH REPORT.FINAL DX SPEC: NORMAL
PATH REPORT.GROSS SPEC: NORMAL

## 2024-01-09 NOTE — PROGRESS NOTES
"Post Operative Visit        Chief Complaint   Patient presents with    Follow-up       HPI:   Cassia López is here for a post op visit.  She had a DILATATION AND CURETTAGE HYSTEROSCOPY and has no complaints.  We have gone over her pathology and any available pictures and all questions have been answered.    Pain:  Yes, slight pain that only happens occasionally  Vaginal bleeding:  No  Vaginal discharge:  No  Fever/chills:  No  Good appetite:  Yes  Normal bladder function:  Yes  Normal bowel function:  No, the patient states she has always had trouble with constipation.    Hot flashes: Yes    PHYSICAL EXAM:  BP 97/59   Pulse 85   Ht 157.5 cm (62\")   Wt 77.1 kg (170 lb)   Breastfeeding No   BMI 31.09 kg/m²   General- NAD, alert and oriented, appropriate  Psych- Normal mood, good memory  Abdomen- Soft, non distended, non tender, no masses  Lymphatic- No palpable groin nodes  Ext- No edema, no cyanosis   Skin- No lesions, no rashes        ASSESSMENT and PLAN:  Post-operative exam    Diagnoses and all orders for this visit:    1. S/P dilatation and curettage (Primary)    Discussed with patient that the endometrium appeared scarred from previous ablation.  There was no much tissue able to be scraped out.  Pathology did not see any endometrium.  Advised if she has any further pain or bleeding we can repeat the ultrasound and reevaluate.    Operative report, surgical findings and any pathology/photos reviewed.  All questions answered.     Counseling:   Ok to resume normal activities  Ok to resume intercourse  Return to school/work without limitations      Follow Up:  Return if symptoms worsen or fail to improve.            Macy Sloan DO  01/11/2024    [facility]  [address] [place] [address]  Dept: [phone]  Dept Fax: [phone]  Loc: [phone]  Loc Fax: [phone]     "

## 2024-01-11 ENCOUNTER — OFFICE VISIT (OUTPATIENT)
Dept: OBSTETRICS AND GYNECOLOGY | Facility: CLINIC | Age: 64
End: 2024-01-11
Payer: OTHER GOVERNMENT

## 2024-01-11 VITALS
DIASTOLIC BLOOD PRESSURE: 59 MMHG | HEART RATE: 85 BPM | SYSTOLIC BLOOD PRESSURE: 97 MMHG | BODY MASS INDEX: 31.28 KG/M2 | WEIGHT: 170 LBS | HEIGHT: 62 IN

## 2024-01-11 DIAGNOSIS — Z98.890 S/P DILATATION AND CURETTAGE: Primary | ICD-10-CM

## 2024-02-16 DIAGNOSIS — R93.89 THICKENED ENDOMETRIUM: ICD-10-CM

## 2024-02-16 RX ORDER — ASPIRIN 81 MG/1
81 TABLET, CHEWABLE ORAL DAILY
Qty: 90 TABLET | Refills: 3 | Status: SHIPPED | OUTPATIENT
Start: 2024-02-16

## 2024-02-16 RX ORDER — ATENOLOL 25 MG/1
12.5 TABLET ORAL 2 TIMES DAILY
Qty: 90 TABLET | Refills: 1 | Status: SHIPPED | OUTPATIENT
Start: 2024-02-16

## 2024-02-16 RX ORDER — ROSUVASTATIN CALCIUM 40 MG/1
40 TABLET, COATED ORAL NIGHTLY
Qty: 90 TABLET | Refills: 3 | Status: SHIPPED | OUTPATIENT
Start: 2024-02-16

## 2024-02-16 RX ORDER — THYROID 30 MG/1
30 TABLET ORAL DAILY
Qty: 90 TABLET | Refills: 1 | Status: SHIPPED | OUTPATIENT
Start: 2024-02-16

## 2024-02-16 RX ORDER — UREA 10 %
1 LOTION (ML) TOPICAL NIGHTLY PRN
Qty: 30 TABLET | Refills: 0 | Status: SHIPPED | OUTPATIENT
Start: 2024-02-16

## 2024-03-14 ENCOUNTER — OFFICE VISIT (OUTPATIENT)
Dept: INTERNAL MEDICINE | Facility: CLINIC | Age: 64
End: 2024-03-14
Payer: OTHER GOVERNMENT

## 2024-03-14 VITALS
TEMPERATURE: 96.6 F | SYSTOLIC BLOOD PRESSURE: 119 MMHG | HEIGHT: 62 IN | OXYGEN SATURATION: 95 % | BODY MASS INDEX: 31.28 KG/M2 | DIASTOLIC BLOOD PRESSURE: 80 MMHG | HEART RATE: 59 BPM | WEIGHT: 170 LBS

## 2024-03-14 DIAGNOSIS — I47.10 PSVT (PAROXYSMAL SUPRAVENTRICULAR TACHYCARDIA): ICD-10-CM

## 2024-03-14 DIAGNOSIS — I10 PRIMARY HYPERTENSION: ICD-10-CM

## 2024-03-14 DIAGNOSIS — E78.2 MIXED HYPERLIPIDEMIA: ICD-10-CM

## 2024-03-14 DIAGNOSIS — E03.9 ACQUIRED HYPOTHYROIDISM: ICD-10-CM

## 2024-03-14 DIAGNOSIS — Z00.00 ANNUAL PHYSICAL EXAM: Primary | ICD-10-CM

## 2024-03-14 DIAGNOSIS — Z29.11 NEED FOR RSV VACCINATION: ICD-10-CM

## 2024-03-14 LAB
ALBUMIN SERPL-MCNC: 4.1 G/DL (ref 3.5–5.2)
ALBUMIN/GLOB SERPL: 1.8 G/DL
ALP SERPL-CCNC: 56 U/L (ref 39–117)
ALT SERPL W P-5'-P-CCNC: 13 U/L (ref 1–33)
ANION GAP SERPL CALCULATED.3IONS-SCNC: 8.8 MMOL/L (ref 5–15)
AST SERPL-CCNC: 21 U/L (ref 1–32)
BASOPHILS # BLD AUTO: 0.03 10*3/MM3 (ref 0–0.2)
BASOPHILS NFR BLD AUTO: 0.3 % (ref 0–1.5)
BILIRUB SERPL-MCNC: 0.3 MG/DL (ref 0–1.2)
BUN SERPL-MCNC: 26 MG/DL (ref 8–23)
BUN/CREAT SERPL: 23 (ref 7–25)
CALCIUM SPEC-SCNC: 9.1 MG/DL (ref 8.6–10.5)
CHLORIDE SERPL-SCNC: 102 MMOL/L (ref 98–107)
CHOLEST SERPL-MCNC: 125 MG/DL (ref 0–200)
CO2 SERPL-SCNC: 27.2 MMOL/L (ref 22–29)
CREAT SERPL-MCNC: 1.13 MG/DL (ref 0.57–1)
DEPRECATED RDW RBC AUTO: 38.8 FL (ref 37–54)
EGFRCR SERPLBLD CKD-EPI 2021: 54.8 ML/MIN/1.73
EOSINOPHIL # BLD AUTO: 0.21 10*3/MM3 (ref 0–0.4)
EOSINOPHIL NFR BLD AUTO: 2.3 % (ref 0.3–6.2)
ERYTHROCYTE [DISTWIDTH] IN BLOOD BY AUTOMATED COUNT: 12.3 % (ref 12.3–15.4)
GLOBULIN UR ELPH-MCNC: 2.3 GM/DL
GLUCOSE SERPL-MCNC: 83 MG/DL (ref 65–99)
HCT VFR BLD AUTO: 38.2 % (ref 34–46.6)
HDLC SERPL-MCNC: 42 MG/DL (ref 40–60)
HGB BLD-MCNC: 12.6 G/DL (ref 12–15.9)
IMM GRANULOCYTES # BLD AUTO: 0.02 10*3/MM3 (ref 0–0.05)
IMM GRANULOCYTES NFR BLD AUTO: 0.2 % (ref 0–0.5)
LDLC SERPL CALC-MCNC: 55 MG/DL (ref 0–100)
LDLC/HDLC SERPL: 1.18 {RATIO}
LYMPHOCYTES # BLD AUTO: 3.1 10*3/MM3 (ref 0.7–3.1)
LYMPHOCYTES NFR BLD AUTO: 33.4 % (ref 19.6–45.3)
MCH RBC QN AUTO: 28.4 PG (ref 26.6–33)
MCHC RBC AUTO-ENTMCNC: 33 G/DL (ref 31.5–35.7)
MCV RBC AUTO: 86 FL (ref 79–97)
MONOCYTES # BLD AUTO: 0.67 10*3/MM3 (ref 0.1–0.9)
MONOCYTES NFR BLD AUTO: 7.2 % (ref 5–12)
NEUTROPHILS NFR BLD AUTO: 5.26 10*3/MM3 (ref 1.7–7)
NEUTROPHILS NFR BLD AUTO: 56.6 % (ref 42.7–76)
NRBC BLD AUTO-RTO: 0 /100 WBC (ref 0–0.2)
PLATELET # BLD AUTO: 197 10*3/MM3 (ref 140–450)
PMV BLD AUTO: 11.3 FL (ref 6–12)
POTASSIUM SERPL-SCNC: 4.2 MMOL/L (ref 3.5–5.2)
PROT SERPL-MCNC: 6.4 G/DL (ref 6–8.5)
RBC # BLD AUTO: 4.44 10*6/MM3 (ref 3.77–5.28)
SODIUM SERPL-SCNC: 138 MMOL/L (ref 136–145)
TRIGL SERPL-MCNC: 167 MG/DL (ref 0–150)
TSH SERPL DL<=0.05 MIU/L-ACNC: 1.52 UIU/ML (ref 0.27–4.2)
VLDLC SERPL-MCNC: 28 MG/DL (ref 5–40)
WBC NRBC COR # BLD AUTO: 9.29 10*3/MM3 (ref 3.4–10.8)

## 2024-03-14 PROCEDURE — 84443 ASSAY THYROID STIM HORMONE: CPT | Performed by: INTERNAL MEDICINE

## 2024-03-14 PROCEDURE — 80061 LIPID PANEL: CPT | Performed by: INTERNAL MEDICINE

## 2024-03-14 PROCEDURE — 85025 COMPLETE CBC W/AUTO DIFF WBC: CPT | Performed by: INTERNAL MEDICINE

## 2024-03-14 PROCEDURE — 80053 COMPREHEN METABOLIC PANEL: CPT | Performed by: INTERNAL MEDICINE

## 2024-03-14 NOTE — PROGRESS NOTES
"Chief Complaint  Follow-up (6 month follow up), Hypertension (6 month follow up), and Hyperlipidemia (6 month follow up)    Subjective          Cassia López presents to [facility]  History of Present Illness  Hypertension- patient feels well. Patient denies headaches, dizziness, chest pain  Hypothyroid- due for recheck  Hyperlipidemia- due for recheck  SVT- patient without episodes recently with using BB.     Current Outpatient Medications   Medication Instructions    albuterol sulfate HFA (ProAir HFA) 108 (90 Base) MCG/ACT inhaler 1 puff, Inhalation, Every 4 Hours PRN    [place] Thyroid 30 mg, Oral, Daily    aspirin 81 mg, Oral, Daily    atenolol (TENORMIN) 12.5 mg, Oral, 2 Times Daily    Diclofenac Sodium (VOLTAREN) 4 g, Topical, 4 Times Daily PRN    DULoxetine (CYMBALTA) 60 mg, Oral, Daily    fluticasone (FLONASE) 50 MCG/ACT nasal spray 2 sprays, Nasal, Daily    ibuprofen (ADVIL,MOTRIN) 800 mg, Oral, Every 6 Hours PRN    lidocaine (LIDODERM) 5 % 1 patch, Transdermal, Every 24 Hours    melatonin 1 mg, Oral, Nightly PRN    meloxicam (MOBIC) 15 mg, Oral, Daily    montelukast (SINGULAIR) 10 mg, Oral, Nightly    olopatadine (Pataday) 0.1 % ophthalmic solution 1 drop, Both Eyes, 2 Times Daily    ondansetron (ZOFRAN) 8 mg, Oral, Every 8 Hours PRN    pantoprazole (PROTONIX) 40 mg, Oral, 2 Times Daily    rosuvastatin (CRESTOR) 40 mg, Oral, Nightly       The following portions of the patient's history were reviewed and updated as appropriate: allergies, current medications, past family history, past medical history, past social history, past surgical history, and problem list.    Objective   Vital Signs:   /80   Pulse 59   Temp 96.6 °F (35.9 °C) (Temporal)   Ht 157.5 cm (62\")   Wt 77.1 kg (170 lb)   SpO2 95%   BMI 31.09 kg/m²     BP Readings from Last 3 Encounters:   03/14/24 119/80   01/11/24 97/59   12/29/23 117/75     Wt Readings from Last 3 Encounters: "   03/14/24 77.1 kg (170 lb)   01/11/24 77.1 kg (170 lb)   12/29/23 76.8 kg (169 lb 5 oz)        Physical Exam   Appearance: No acute distress, well-nourished  Head: normocephalic, atraumatic  Eyes: extraocular movements intact, no scleral icterus, no conjunctival injection  Ears, Nose, and Throat: external ears normal, nares patent, moist mucous membranes  Cardiovascular: regular rate and rhythm. no murmurs, rubs, or gallops. no edema  Respiratory: breathing comfortably, symmetric chest rise, clear to auscultation bilaterally. No wheezes, rales, or rhonchi.  Neuro: alert and oriented to time, place, and person. Normal gait  Psych: normal mood and affect     Result Review :   The following data was reviewed by: Andrew Clemens Jr, MD on 03/14/2024:  Common labs          9/14/2023    17:03 12/29/2023    10:55 12/29/2023    12:21 3/14/2024    16:30   Common Labs   Glucose 101    83    BUN 20    26    Creatinine 0.97    1.13    Sodium 141    138    Potassium 5.8   4.7  4.2    Chloride 77    102    Calcium 9.1    9.1    Albumin 4.0    4.1    Total Bilirubin 0.2    0.3    Alkaline Phosphatase 52    56    AST (SGOT) 20    21    ALT (SGPT) 11    13    WBC 8.36  8.73   9.29    Hemoglobin 12.1  13.9   12.6    Hematocrit 37.2  43.4   38.2    Platelets 176  215   197    Total Cholesterol 127    125    Triglycerides 124    167    HDL Cholesterol 51    42    LDL Cholesterol  54    55        Lab Results   Component Value Date    COVID19 Not Detected 07/07/2021          Assessment and Plan    Diagnoses and all orders for this visit:    1. Annual physical exam (Primary)    2. PSVT (paroxysmal supraventricular tachycardia)  Comments:  cont BB for control    3. Acquired hypothyroidism  -     TSH Rfx On Abnormal To Free T4    4. Primary hypertension  Comments:  well controlled. cont regimen.  Orders:  -     CBC & Differential  -     Comprehensive Metabolic Panel    5. Mixed hyperlipidemia  -     Lipid Panel    6. Need for RSV  vaccination      Advised on diet, physical activity, etc    There are no discontinued medications.     Follow Up   Return in about 6 months (around 9/14/2024).  Patient was given instructions and counseling regarding her condition or for health maintenance advice. Please see specific information pulled into the AVS if appropriate.       Andrew Clemens Jr, MD  03/15/24  11:36 EDT

## 2024-05-09 ENCOUNTER — HOSPITAL ENCOUNTER (OUTPATIENT)
Dept: OTHER | Facility: HOSPITAL | Age: 64
Discharge: HOME | End: 2024-05-09

## 2024-05-29 ENCOUNTER — TRANSCRIBE ORDERS (OUTPATIENT)
Dept: ADMINISTRATIVE | Facility: HOSPITAL | Age: 64
End: 2024-05-29
Payer: OTHER GOVERNMENT

## 2024-05-29 DIAGNOSIS — N63.11 MASS OF UPPER OUTER QUADRANT OF RIGHT BREAST: ICD-10-CM

## 2024-05-29 DIAGNOSIS — N63.0 MASS OF BREAST, UNSPECIFIED LATERALITY: Primary | ICD-10-CM

## 2024-06-03 DIAGNOSIS — R93.89 THICKENED ENDOMETRIUM: ICD-10-CM

## 2024-06-04 RX ORDER — ALBUTEROL SULFATE 90 UG/1
1 AEROSOL, METERED RESPIRATORY (INHALATION) EVERY 4 HOURS PRN
Qty: 18 G | Refills: 1 | Status: SHIPPED | OUTPATIENT
Start: 2024-06-04

## 2024-06-04 RX ORDER — LIDOCAINE 50 MG/G
1 PATCH TOPICAL EVERY 24 HOURS
Qty: 90 EACH | Refills: 3 | Status: SHIPPED | OUTPATIENT
Start: 2024-06-04

## 2024-06-04 RX ORDER — ONDANSETRON HYDROCHLORIDE 8 MG/1
8 TABLET, FILM COATED ORAL EVERY 8 HOURS PRN
Qty: 20 TABLET | Refills: 0 | Status: SHIPPED | OUTPATIENT
Start: 2024-06-04

## 2024-06-04 RX ORDER — DULOXETIN HYDROCHLORIDE 60 MG/1
60 CAPSULE, DELAYED RELEASE ORAL DAILY
Qty: 90 CAPSULE | Refills: 1 | Status: SHIPPED | OUTPATIENT
Start: 2024-06-04

## 2024-06-04 RX ORDER — UREA 10 %
1 LOTION (ML) TOPICAL NIGHTLY PRN
Qty: 30 TABLET | Refills: 0 | Status: SHIPPED | OUTPATIENT
Start: 2024-06-04

## 2024-06-04 RX ORDER — ASPIRIN 81 MG/1
81 TABLET, CHEWABLE ORAL DAILY
Qty: 90 TABLET | Refills: 3 | Status: SHIPPED | OUTPATIENT
Start: 2024-06-04

## 2024-06-04 RX ORDER — ATENOLOL 25 MG/1
12.5 TABLET ORAL 2 TIMES DAILY
Qty: 90 TABLET | Refills: 1 | Status: SHIPPED | OUTPATIENT
Start: 2024-06-04

## 2024-06-04 RX ORDER — ROSUVASTATIN CALCIUM 40 MG/1
40 TABLET, COATED ORAL NIGHTLY
Qty: 90 TABLET | Refills: 3 | Status: SHIPPED | OUTPATIENT
Start: 2024-06-04

## 2024-06-04 RX ORDER — MONTELUKAST SODIUM 10 MG/1
10 TABLET ORAL NIGHTLY
Qty: 90 TABLET | Refills: 3 | Status: SHIPPED | OUTPATIENT
Start: 2024-06-04

## 2024-06-04 RX ORDER — OLOPATADINE HYDROCHLORIDE 1 MG/ML
1 SOLUTION/ DROPS OPHTHALMIC 2 TIMES DAILY
Qty: 15 ML | Refills: 3 | Status: SHIPPED | OUTPATIENT
Start: 2024-06-04

## 2024-06-04 RX ORDER — PANTOPRAZOLE SODIUM 40 MG/1
40 TABLET, DELAYED RELEASE ORAL 2 TIMES DAILY
Qty: 180 TABLET | Refills: 1 | Status: SHIPPED | OUTPATIENT
Start: 2024-06-04

## 2024-06-04 RX ORDER — THYROID 30 MG/1
30 TABLET ORAL DAILY
Qty: 90 TABLET | Refills: 1 | Status: SHIPPED | OUTPATIENT
Start: 2024-06-04

## 2024-06-04 RX ORDER — IBUPROFEN 800 MG/1
800 TABLET ORAL EVERY 6 HOURS PRN
Qty: 90 TABLET | Refills: 3 | Status: SHIPPED | OUTPATIENT
Start: 2024-06-04

## 2024-06-04 RX ORDER — MELOXICAM 15 MG/1
15 TABLET ORAL DAILY
Qty: 90 TABLET | Refills: 1 | Status: SHIPPED | OUTPATIENT
Start: 2024-06-04

## 2024-06-04 RX ORDER — FLUTICASONE PROPIONATE 50 MCG
2 SPRAY, SUSPENSION (ML) NASAL DAILY
Qty: 16 G | Refills: 3 | Status: SHIPPED | OUTPATIENT
Start: 2024-06-04

## 2024-07-08 ENCOUNTER — HOSPITAL ENCOUNTER (OUTPATIENT)
Dept: ULTRASOUND IMAGING | Facility: HOSPITAL | Age: 64
Discharge: HOME | End: 2024-07-08
Payer: OTHER GOVERNMENT

## 2024-07-08 ENCOUNTER — HOSPITAL ENCOUNTER (OUTPATIENT)
Dept: MAMMOGRAPHY | Facility: HOSPITAL | Age: 64
Discharge: HOME | End: 2024-07-08
Payer: OTHER GOVERNMENT

## 2024-07-08 DIAGNOSIS — N63.11 MASS OF UPPER OUTER QUADRANT OF RIGHT BREAST: ICD-10-CM

## 2024-07-08 DIAGNOSIS — N63.0 MASS OF BREAST, UNSPECIFIED LATERALITY: ICD-10-CM

## 2024-07-08 PROCEDURE — G0279 TOMOSYNTHESIS, MAMMO: HCPCS

## 2024-07-08 PROCEDURE — 76642 ULTRASOUND BREAST LIMITED: CPT

## 2024-07-08 PROCEDURE — 77065 DX MAMMO INCL CAD UNI: CPT

## 2024-09-03 ENCOUNTER — OFFICE VISIT (OUTPATIENT)
Dept: INTERNAL MEDICINE | Facility: CLINIC | Age: 64
End: 2024-09-03
Payer: OTHER GOVERNMENT

## 2024-09-03 VITALS
SYSTOLIC BLOOD PRESSURE: 129 MMHG | WEIGHT: 177.6 LBS | BODY MASS INDEX: 32.68 KG/M2 | HEART RATE: 63 BPM | TEMPERATURE: 96.8 F | OXYGEN SATURATION: 96 % | HEIGHT: 62 IN | DIASTOLIC BLOOD PRESSURE: 75 MMHG

## 2024-09-03 DIAGNOSIS — J18.9 COMMUNITY ACQUIRED PNEUMONIA, UNSPECIFIED LATERALITY: Primary | ICD-10-CM

## 2024-09-03 DIAGNOSIS — E03.9 ACQUIRED HYPOTHYROIDISM: ICD-10-CM

## 2024-09-03 DIAGNOSIS — R05.9 COUGH, UNSPECIFIED TYPE: ICD-10-CM

## 2024-09-03 DIAGNOSIS — E78.2 MIXED HYPERLIPIDEMIA: ICD-10-CM

## 2024-09-03 DIAGNOSIS — I10 PRIMARY HYPERTENSION: ICD-10-CM

## 2024-09-03 LAB
ALBUMIN SERPL-MCNC: 4 G/DL (ref 3.5–5.2)
ALBUMIN/GLOB SERPL: 2 G/DL
ALP SERPL-CCNC: 62 U/L (ref 39–117)
ALT SERPL W P-5'-P-CCNC: 8 U/L (ref 1–33)
ANION GAP SERPL CALCULATED.3IONS-SCNC: 8 MMOL/L (ref 5–15)
AST SERPL-CCNC: 15 U/L (ref 1–32)
BASOPHILS # BLD AUTO: 0.02 10*3/MM3 (ref 0–0.2)
BASOPHILS NFR BLD AUTO: 0.3 % (ref 0–1.5)
BILIRUB SERPL-MCNC: 0.4 MG/DL (ref 0–1.2)
BUN SERPL-MCNC: 12 MG/DL (ref 8–23)
BUN/CREAT SERPL: 13.2 (ref 7–25)
CALCIUM SPEC-SCNC: 9.3 MG/DL (ref 8.6–10.5)
CHLORIDE SERPL-SCNC: 107 MMOL/L (ref 98–107)
CHOLEST SERPL-MCNC: 140 MG/DL (ref 0–200)
CO2 SERPL-SCNC: 27 MMOL/L (ref 22–29)
CREAT SERPL-MCNC: 0.91 MG/DL (ref 0.57–1)
DEPRECATED RDW RBC AUTO: 39.2 FL (ref 37–54)
EGFRCR SERPLBLD CKD-EPI 2021: 71 ML/MIN/1.73
EOSINOPHIL # BLD AUTO: 0.23 10*3/MM3 (ref 0–0.4)
EOSINOPHIL NFR BLD AUTO: 3.2 % (ref 0.3–6.2)
ERYTHROCYTE [DISTWIDTH] IN BLOOD BY AUTOMATED COUNT: 12.6 % (ref 12.3–15.4)
EXPIRATION DATE: 0
EXPIRATION DATE: 0
FLUAV AG UPPER RESP QL IA.RAPID: NOT DETECTED
FLUBV AG UPPER RESP QL IA.RAPID: NOT DETECTED
GLOBULIN UR ELPH-MCNC: 2 GM/DL
GLUCOSE SERPL-MCNC: 61 MG/DL (ref 65–99)
HCT VFR BLD AUTO: 37.1 % (ref 34–46.6)
HDLC SERPL-MCNC: 46 MG/DL (ref 40–60)
HGB BLD-MCNC: 12 G/DL (ref 12–15.9)
IMM GRANULOCYTES # BLD AUTO: 0.02 10*3/MM3 (ref 0–0.05)
IMM GRANULOCYTES NFR BLD AUTO: 0.3 % (ref 0–0.5)
INTERNAL CONTROL: NORMAL
INTERNAL CONTROL: NORMAL
LDLC SERPL CALC-MCNC: 69 MG/DL (ref 0–100)
LDLC/HDLC SERPL: 1.41 {RATIO}
LYMPHOCYTES # BLD AUTO: 2.08 10*3/MM3 (ref 0.7–3.1)
LYMPHOCYTES NFR BLD AUTO: 29.1 % (ref 19.6–45.3)
Lab: 0
Lab: 0
MCH RBC QN AUTO: 28 PG (ref 26.6–33)
MCHC RBC AUTO-ENTMCNC: 32.3 G/DL (ref 31.5–35.7)
MCV RBC AUTO: 86.7 FL (ref 79–97)
MONOCYTES # BLD AUTO: 0.62 10*3/MM3 (ref 0.1–0.9)
MONOCYTES NFR BLD AUTO: 8.7 % (ref 5–12)
NEUTROPHILS NFR BLD AUTO: 4.19 10*3/MM3 (ref 1.7–7)
NEUTROPHILS NFR BLD AUTO: 58.4 % (ref 42.7–76)
NRBC BLD AUTO-RTO: 0 /100 WBC (ref 0–0.2)
PLATELET # BLD AUTO: 161 10*3/MM3 (ref 140–450)
PMV BLD AUTO: 11.4 FL (ref 6–12)
POTASSIUM SERPL-SCNC: 4.2 MMOL/L (ref 3.5–5.2)
PROT SERPL-MCNC: 6 G/DL (ref 6–8.5)
RBC # BLD AUTO: 4.28 10*6/MM3 (ref 3.77–5.28)
S PYO AG THROAT QL: NEGATIVE
SARS-COV-2 AG UPPER RESP QL IA.RAPID: NOT DETECTED
SARS-COV-2 RNA RESP QL NAA+PROBE: NOT DETECTED
SODIUM SERPL-SCNC: 142 MMOL/L (ref 136–145)
TRIGL SERPL-MCNC: 146 MG/DL (ref 0–150)
TSH SERPL DL<=0.05 MIU/L-ACNC: 1.59 UIU/ML (ref 0.27–4.2)
VLDLC SERPL-MCNC: 25 MG/DL (ref 5–40)
WBC NRBC COR # BLD AUTO: 7.16 10*3/MM3 (ref 3.4–10.8)

## 2024-09-03 PROCEDURE — 80053 COMPREHEN METABOLIC PANEL: CPT | Performed by: INTERNAL MEDICINE

## 2024-09-03 PROCEDURE — 84443 ASSAY THYROID STIM HORMONE: CPT | Performed by: INTERNAL MEDICINE

## 2024-09-03 PROCEDURE — 80061 LIPID PANEL: CPT | Performed by: INTERNAL MEDICINE

## 2024-09-03 PROCEDURE — 85025 COMPLETE CBC W/AUTO DIFF WBC: CPT | Performed by: INTERNAL MEDICINE

## 2024-09-03 PROCEDURE — 87428 SARSCOV & INF VIR A&B AG IA: CPT | Performed by: INTERNAL MEDICINE

## 2024-09-03 PROCEDURE — 87081 CULTURE SCREEN ONLY: CPT | Performed by: INTERNAL MEDICINE

## 2024-09-03 PROCEDURE — 99214 OFFICE O/P EST MOD 30 MIN: CPT | Performed by: INTERNAL MEDICINE

## 2024-09-03 PROCEDURE — 87880 STREP A ASSAY W/OPTIC: CPT | Performed by: INTERNAL MEDICINE

## 2024-09-03 PROCEDURE — 87635 SARS-COV-2 COVID-19 AMP PRB: CPT | Performed by: INTERNAL MEDICINE

## 2024-09-03 RX ORDER — THYROID 30 MG/1
30 TABLET ORAL DAILY
Qty: 90 TABLET | Refills: 1 | Status: SHIPPED | OUTPATIENT
Start: 2024-09-03

## 2024-09-03 RX ORDER — PREDNISONE 20 MG/1
40 TABLET ORAL DAILY
Qty: 10 TABLET | Refills: 0 | Status: SHIPPED | OUTPATIENT
Start: 2024-09-03 | End: 2024-09-08

## 2024-09-03 RX ORDER — LEVOFLOXACIN 500 MG/1
500 TABLET, FILM COATED ORAL DAILY
Qty: 7 TABLET | Refills: 0 | Status: SHIPPED | OUTPATIENT
Start: 2024-09-03 | End: 2024-09-10

## 2024-09-03 RX ORDER — ATENOLOL 25 MG/1
12.5 TABLET ORAL 2 TIMES DAILY
Qty: 90 TABLET | Refills: 1 | Status: SHIPPED | OUTPATIENT
Start: 2024-09-03

## 2024-09-03 NOTE — PROGRESS NOTES
"Chief Complaint  Hypertension, Cough, Fever (3 days ), and Chills (Cold and hot )    Subjective          Cassia López presents to [facility]  History of Present Illness  Patient with cough, congestion, subjective fever x3 days. Patient denies chest pain, shortness of breath, vomiting and diarrhea. Patient has noticed nausea and wheezing.  Sick contacts include  with similar illness.  Hypertension-patient without home readings.  Hypothyroid-patient due for recheck.    Current Outpatient Medications   Medication Instructions    albuterol sulfate HFA (ProAir HFA) 108 (90 Base) MCG/ACT inhaler 1 puff, Inhalation, Every 4 Hours PRN    [place] Thyroid 30 mg, Oral, Daily    aspirin 81 mg, Oral, Daily    atenolol (TENORMIN) 12.5 mg, Oral, 2 Times Daily    Diclofenac Sodium (VOLTAREN) 4 g, Topical, 4 Times Daily PRN    DULoxetine (CYMBALTA) 60 mg, Oral, Daily    fluticasone (FLONASE) 50 MCG/ACT nasal spray 2 sprays, Nasal, Daily    ibuprofen (ADVIL,MOTRIN) 800 mg, Oral, Every 6 Hours PRN    levoFLOXacin (LEVAQUIN) 500 mg, Oral, Daily    lidocaine (LIDODERM) 5 % 1 patch, Transdermal, Every 24 Hours    melatonin 1 mg, Oral, Nightly PRN    meloxicam (MOBIC) 15 mg, Oral, Daily    montelukast (SINGULAIR) 10 mg, Oral, Nightly    olopatadine (Pataday) 0.1 % ophthalmic solution 1 drop, Both Eyes, 2 Times Daily    ondansetron (ZOFRAN) 8 mg, Oral, Every 8 Hours PRN    pantoprazole (PROTONIX) 40 mg, Oral, 2 Times Daily    predniSONE (DELTASONE) 40 mg, Oral, Daily    rosuvastatin (CRESTOR) 40 mg, Oral, Nightly       The following portions of the patient's history were reviewed and updated as appropriate: allergies, current medications, past family history, past medical history, past social history, past surgical history, and problem list.    Objective   Vital Signs:   /75 (BP Location: Left arm)   Pulse 63   Temp 96.8 °F (36 °C) (Temporal)   Ht 157.5 cm (62\")   Wt 80.6 " kg (177 lb 9.6 oz)   SpO2 96%   BMI 32.48 kg/m²     BP Readings from Last 3 Encounters:   09/03/24 129/75   03/14/24 119/80   01/11/24 97/59     Wt Readings from Last 3 Encounters:   09/03/24 80.6 kg (177 lb 9.6 oz)   03/14/24 77.1 kg (170 lb)   01/11/24 77.1 kg (170 lb)     SpO2 Readings from Last 3 Encounters:   09/03/24 96%   03/14/24 95%   12/29/23 96%        Physical Exam   Appearance: No acute distress, well-nourished  Head: normocephalic, atraumatic  Eyes: extraocular movements intact, no scleral icterus, no conjunctival injection  Ears, Nose, and Throat: external ears normal, nares patent, moist mucous membranes  Cardiovascular: regular rate and rhythm. no murmurs, rubs, or gallops. no edema  Respiratory: breathing comfortably, symmetric chest rise, clear to auscultation bilaterally. No wheezes, rales, or rhonchi.  Neuro: alert and oriented to time, place, and person. Normal gait  Psych: normal mood and affect     Result Review :   The following data was reviewed by: Andrew Clemens Jr, MD on 09/03/2024:  Common labs          9/14/2023    17:03 12/29/2023    10:55 12/29/2023    12:21 3/14/2024    16:30   Common Labs   Glucose 101    83    BUN 20    26    Creatinine 0.97    1.13    Sodium 141    138    Potassium 5.8   4.7  4.2    Chloride 77    102    Calcium 9.1    9.1    Albumin 4.0    4.1    Total Bilirubin 0.2    0.3    Alkaline Phosphatase 52    56    AST (SGOT) 20    21    ALT (SGPT) 11    13    WBC 8.36  8.73   9.29    Hemoglobin 12.1  13.9   12.6    Hematocrit 37.2  43.4   38.2    Platelets 176  215   197    Total Cholesterol 127    125    Triglycerides 124    167    HDL Cholesterol 51    42    LDL Cholesterol  54    55        Lab Results   Component Value Date    SARSANTIGEN Not Detected 09/03/2024    COVID19 Not Detected 07/07/2021    FLUAAG Not Detected 09/03/2024    FLUBAG Not Detected 09/03/2024    RAPSCRN Negative 09/03/2024          Assessment and Plan    Diagnoses and all orders for  this visit:    1. Community acquired pneumonia, unspecified laterality (Primary)  Comments:  Rapid test negative.  Will treat given concurrent pneumonia and spouse.  Orders:  -     levoFLOXacin (Levaquin) 500 MG tablet; Take 1 tablet by mouth Daily for 7 days.  Dispense: 7 tablet; Refill: 0  -     predniSONE (DELTASONE) 20 MG tablet; Take 2 tablets by mouth Daily for 5 days.  Dispense: 10 tablet; Refill: 0    2. Cough, unspecified type  -     POCT SARS-CoV-2 Antigen MARCELL + Flu  -     POCT rapid strep A  -     Beta Strep Culture, Throat - Swab, Throat  -     COVID-19,CEPHEID/LYUDMILA,COR/DREA/PAD/NATTY/LAG/LYDIA IN-HOUSE,NP SWAB IN TRANSPORT MEDIA 1 HR TAT, RT-PCR - Swab, Nasopharynx; Future  -     COVID-19,CEPHEID/LYUDMILA,COR/DREA/PAD/NATTY/LAG/LYDIA IN-HOUSE,NP SWAB IN TRANSPORT MEDIA 1 HR TAT, RT-PCR - Swab, Nasopharynx    3. Acquired hypothyroidism  -     [place] Thyroid 30 MG tablet; Take 1 tablet by mouth Daily.  Dispense: 90 tablet; Refill: 1  -     TSH Rfx On Abnormal To Free T4    4. Primary hypertension  -     Comprehensive Metabolic Panel  -     CBC & Differential  -     atenolol (Tenormin) 25 MG tablet; Take 0.5 tablets by mouth 2 (Two) Times a Day.  Dispense: 90 tablet; Refill: 1    5. Mixed hyperlipidemia  -     Lipid Panel        Medications Discontinued During This Encounter   Medication Reason    [place] Thyroid 30 MG tablet Reorder    atenolol (Tenormin) 25 MG tablet Reorder        Follow Up   Return if symptoms worsen or fail to improve.  Patient was given instructions and counseling regarding her condition or for health maintenance advice. Please see specific information pulled into the AVS if appropriate.       Andrew Clemens Jr, MD  09/03/24  14:08 EDT

## 2024-09-05 LAB — BACTERIA SPEC AEROBE CULT: NORMAL

## 2024-12-24 ENCOUNTER — TRANSCRIBE ORDERS (OUTPATIENT)
Dept: ADMINISTRATIVE | Facility: HOSPITAL | Age: 64
End: 2024-12-24
Payer: OTHER GOVERNMENT

## 2024-12-24 DIAGNOSIS — Z51.89 ENCOUNTER FOR OTHER SPECIFIED AFTERCARE: Primary | ICD-10-CM

## 2024-12-27 ENCOUNTER — TRANSCRIBE ORDERS (OUTPATIENT)
Dept: ADMINISTRATIVE | Facility: HOSPITAL | Age: 64
End: 2024-12-27
Payer: OTHER GOVERNMENT

## 2024-12-27 DIAGNOSIS — R92.8 MAMMOGRAM ABNORMAL: Primary | ICD-10-CM

## 2025-02-06 ENCOUNTER — HOSPITAL ENCOUNTER (OUTPATIENT)
Dept: ULTRASOUND IMAGING | Facility: HOSPITAL | Age: 65
Discharge: HOME | End: 2025-02-06
Payer: OTHER GOVERNMENT

## 2025-02-06 ENCOUNTER — HOSPITAL ENCOUNTER (OUTPATIENT)
Dept: MAMMOGRAPHY | Facility: HOSPITAL | Age: 65
Discharge: HOME | End: 2025-02-06
Payer: OTHER GOVERNMENT

## 2025-02-06 DIAGNOSIS — Z51.89 ENCOUNTER FOR OTHER SPECIFIED AFTERCARE: ICD-10-CM

## 2025-02-06 PROCEDURE — 76642 ULTRASOUND BREAST LIMITED: CPT

## 2025-02-26 NOTE — PROGRESS NOTES
"Chief Complaint  Hypertension and Diabetes    Subjective          Cassia López presents to [facility]    Patient overall feeling well. Patient without acute complaints. She does have some chronic fatigue and arthrlagias. She is due for labs.     Current Outpatient Medications   Medication Instructions    albuterol sulfate HFA (ProAir HFA) 108 (90 Base) MCG/ACT inhaler 1 puff, Inhalation, Every 4 Hours PRN    [place] Thyroid 30 mg, Oral, Daily    aspirin 81 mg, Oral, Daily    atenolol (TENORMIN) 12.5 mg, Oral, 2 Times Daily    Diclofenac Sodium (VOLTAREN) 4 g, Topical, 4 Times Daily PRN    DULoxetine (CYMBALTA) 60 mg, Oral, Daily    fluticasone (FLONASE) 50 MCG/ACT nasal spray 2 sprays, Nasal, Daily    ibuprofen (ADVIL,MOTRIN) 800 mg, Oral, Every 6 Hours PRN    lidocaine (LIDODERM) 5 % 1 patch, Transdermal, Every 24 Hours    melatonin 1 mg, Oral, Nightly PRN    meloxicam (MOBIC) 15 mg, Oral, Daily    montelukast (SINGULAIR) 10 mg, Oral, Nightly    olopatadine (Pataday) 0.1 % ophthalmic solution 1 drop, Both Eyes, 2 Times Daily    ondansetron (ZOFRAN) 8 mg, Oral, Every 8 Hours PRN    pantoprazole (PROTONIX) 40 mg, Oral, 2 Times Daily    rosuvastatin (CRESTOR) 40 mg, Oral, Nightly       The following portions of the patient's history were reviewed and updated as appropriate: allergies, current medications, past family history, past medical history, past social history, past surgical history, and problem list.    Objective   Vital Signs:   /74   Pulse 60   Temp 97.7 °F (36.5 °C)   Ht 157.5 cm (62\")   Wt 80.3 kg (177 lb)   SpO2 96%   BMI 32.37 kg/m²     BP Readings from Last 3 Encounters:   03/04/25 130/74   09/03/24 129/75   03/14/24 119/80     Wt Readings from Last 3 Encounters:   03/04/25 80.3 kg (177 lb)   09/03/24 80.6 kg (177 lb 9.6 oz)   03/14/24 77.1 kg (170 lb)     Physical Exam   Appearance: No acute distress, well-nourished  Head: normocephalic, " atraumatic  Eyes: extraocular movements intact, no scleral icterus, no conjunctival injection  Ears, Nose, and Throat: external ears normal, nares patent, moist mucous membranes  Cardiovascular: regular rate and rhythm. no murmurs, rubs, or gallops. no edema  Respiratory: breathing comfortably, symmetric chest rise, clear to auscultation bilaterally. No wheezes, rales, or rhonchi.  Neuro: alert and oriented to time, place, and person. Normal gait  Psych: normal mood and affect     Result Review :   The following data was reviewed by: Andrew Clemens Jr, MD on 03/04/2025:  Common labs          9/3/2024    12:16 3/4/2025    16:26   Common Labs   Glucose 61  87    BUN 12  16    Creatinine 0.91  0.98    Sodium 142  142    Potassium 4.2  4.4    Chloride 107  107    Calcium 9.3  9.3    Albumin 4.0  3.9    Total Bilirubin 0.4  <0.2    Alkaline Phosphatase 62  67    AST (SGOT) 15  22    ALT (SGPT) 8  12    WBC 7.16  9.09    Hemoglobin 12.0  12.8    Hematocrit 37.1  40.0    Platelets 161  208    Total Cholesterol 140  153    Triglycerides 146  104    HDL Cholesterol 46  55    LDL Cholesterol  69  79        Lab Results   Component Value Date    SARSANTIGEN Not Detected 09/03/2024    COVID19 Not Detected 09/03/2024    FLUAAG Not Detected 09/03/2024    FLUBAG Not Detected 09/03/2024    RAPSCRN Negative 09/03/2024        Assessment and Plan    Diagnoses and all orders for this visit:    1. Annual physical exam (Primary)    2. Primary hypertension  -     Comprehensive Metabolic Panel  -     CBC & Differential    3. Mixed hyperlipidemia  -     Lipid Panel    4. Need for pneumococcal vaccination  -     Pneumococcal Conjugate Vaccine 20-Valent All    5. Acquired hypothyroidism  -     TSH Rfx On Abnormal To Free T4      Advised on diet, physical activity, etc      There are no discontinued medications.     Follow Up   Return in about 6 months (around 9/4/2025) for HTN.  Patient was given instructions and counseling regarding  her condition or for health maintenance advice. Please see specific information pulled into the AVS if appropriate.       Andrew Clemens Jr, MD  03/22/25  12:51 EDT

## 2025-03-04 ENCOUNTER — OFFICE VISIT (OUTPATIENT)
Dept: INTERNAL MEDICINE | Facility: CLINIC | Age: 65
End: 2025-03-04
Payer: OTHER GOVERNMENT

## 2025-03-04 VITALS
DIASTOLIC BLOOD PRESSURE: 74 MMHG | BODY MASS INDEX: 32.57 KG/M2 | HEIGHT: 62 IN | TEMPERATURE: 97.7 F | HEART RATE: 60 BPM | OXYGEN SATURATION: 96 % | SYSTOLIC BLOOD PRESSURE: 130 MMHG | WEIGHT: 177 LBS

## 2025-03-04 DIAGNOSIS — I10 PRIMARY HYPERTENSION: ICD-10-CM

## 2025-03-04 DIAGNOSIS — E78.2 MIXED HYPERLIPIDEMIA: ICD-10-CM

## 2025-03-04 DIAGNOSIS — Z00.00 ANNUAL PHYSICAL EXAM: Primary | ICD-10-CM

## 2025-03-04 DIAGNOSIS — Z23 NEED FOR PNEUMOCOCCAL VACCINATION: ICD-10-CM

## 2025-03-04 DIAGNOSIS — E03.9 ACQUIRED HYPOTHYROIDISM: ICD-10-CM

## 2025-03-04 LAB
ALBUMIN SERPL-MCNC: 3.9 G/DL (ref 3.5–5.2)
ALBUMIN/GLOB SERPL: 1.6 G/DL
ALP SERPL-CCNC: 67 U/L (ref 39–117)
ALT SERPL W P-5'-P-CCNC: 12 U/L (ref 1–33)
ANION GAP SERPL CALCULATED.3IONS-SCNC: 12.5 MMOL/L (ref 5–15)
AST SERPL-CCNC: 22 U/L (ref 1–32)
BASOPHILS # BLD AUTO: 0.02 10*3/MM3 (ref 0–0.2)
BASOPHILS NFR BLD AUTO: 0.2 % (ref 0–1.5)
BILIRUB SERPL-MCNC: <0.2 MG/DL (ref 0–1.2)
BUN SERPL-MCNC: 16 MG/DL (ref 8–23)
BUN/CREAT SERPL: 16.3 (ref 7–25)
CALCIUM SPEC-SCNC: 9.3 MG/DL (ref 8.6–10.5)
CHLORIDE SERPL-SCNC: 107 MMOL/L (ref 98–107)
CHOLEST SERPL-MCNC: 153 MG/DL (ref 0–200)
CO2 SERPL-SCNC: 22.5 MMOL/L (ref 22–29)
CREAT SERPL-MCNC: 0.98 MG/DL (ref 0.57–1)
DEPRECATED RDW RBC AUTO: 41.3 FL (ref 37–54)
EGFRCR SERPLBLD CKD-EPI 2021: 64.6 ML/MIN/1.73
EOSINOPHIL # BLD AUTO: 0.2 10*3/MM3 (ref 0–0.4)
EOSINOPHIL NFR BLD AUTO: 2.2 % (ref 0.3–6.2)
ERYTHROCYTE [DISTWIDTH] IN BLOOD BY AUTOMATED COUNT: 12.9 % (ref 12.3–15.4)
GLOBULIN UR ELPH-MCNC: 2.4 GM/DL
GLUCOSE SERPL-MCNC: 87 MG/DL (ref 65–99)
HCT VFR BLD AUTO: 40 % (ref 34–46.6)
HDLC SERPL-MCNC: 55 MG/DL (ref 40–60)
HGB BLD-MCNC: 12.8 G/DL (ref 12–15.9)
IMM GRANULOCYTES # BLD AUTO: 0.02 10*3/MM3 (ref 0–0.05)
IMM GRANULOCYTES NFR BLD AUTO: 0.2 % (ref 0–0.5)
LDLC SERPL CALC-MCNC: 79 MG/DL (ref 0–100)
LDLC/HDLC SERPL: 1.4 {RATIO}
LYMPHOCYTES # BLD AUTO: 2.67 10*3/MM3 (ref 0.7–3.1)
LYMPHOCYTES NFR BLD AUTO: 29.4 % (ref 19.6–45.3)
MCH RBC QN AUTO: 28.3 PG (ref 26.6–33)
MCHC RBC AUTO-ENTMCNC: 32 G/DL (ref 31.5–35.7)
MCV RBC AUTO: 88.3 FL (ref 79–97)
MONOCYTES # BLD AUTO: 0.64 10*3/MM3 (ref 0.1–0.9)
MONOCYTES NFR BLD AUTO: 7 % (ref 5–12)
NEUTROPHILS NFR BLD AUTO: 5.54 10*3/MM3 (ref 1.7–7)
NEUTROPHILS NFR BLD AUTO: 61 % (ref 42.7–76)
NRBC BLD AUTO-RTO: 0 /100 WBC (ref 0–0.2)
PLATELET # BLD AUTO: 208 10*3/MM3 (ref 140–450)
PMV BLD AUTO: 11.3 FL (ref 6–12)
POTASSIUM SERPL-SCNC: 4.4 MMOL/L (ref 3.5–5.2)
PROT SERPL-MCNC: 6.3 G/DL (ref 6–8.5)
RBC # BLD AUTO: 4.53 10*6/MM3 (ref 3.77–5.28)
SODIUM SERPL-SCNC: 142 MMOL/L (ref 136–145)
TRIGL SERPL-MCNC: 104 MG/DL (ref 0–150)
TSH SERPL DL<=0.05 MIU/L-ACNC: 2.35 UIU/ML (ref 0.27–4.2)
VLDLC SERPL-MCNC: 19 MG/DL (ref 5–40)
WBC NRBC COR # BLD AUTO: 9.09 10*3/MM3 (ref 3.4–10.8)

## 2025-03-04 PROCEDURE — 80061 LIPID PANEL: CPT | Performed by: INTERNAL MEDICINE

## 2025-03-04 PROCEDURE — 85025 COMPLETE CBC W/AUTO DIFF WBC: CPT | Performed by: INTERNAL MEDICINE

## 2025-03-04 PROCEDURE — 80053 COMPREHEN METABOLIC PANEL: CPT | Performed by: INTERNAL MEDICINE

## 2025-03-04 PROCEDURE — 84443 ASSAY THYROID STIM HORMONE: CPT | Performed by: INTERNAL MEDICINE

## 2025-03-04 NOTE — LETTER
[facility]  Vaccine Consent Form    Patient Name:  Cassia López  Patient :  1960     Vaccine(s) Ordered    Pneumococcal Conjugate Vaccine 20-Valent All        Screening Checklist  The following questions should be completed prior to vaccination. If you answer “yes” to any question, it does not necessarily mean you should not be vaccinated. It just means we may need to clarify or ask more questions. If a question is unclear, please ask your healthcare provider to explain it.    Yes No   Any fever or moderate to severe illness today (mild illness and/or antibiotic treatment are not contraindications)?     Do you have a history of a serious reaction to any previous vaccinations, such as anaphylaxis, encephalopathy within 7 days, Guillain-Watkins syndrome within 6 weeks, seizure?     Have you received any live vaccine(s) (e.g MMR, GRZEGORZ) or any other vaccines in the last month (to ensure duplicate doses aren't given)?     Do you have an anaphylactic allergy to latex (DTaP, DTaP-IPV, Hep A, Hep B, MenB, RV, Td, Tdap), baker’s yeast (Hep B, HPV), polysorbates (RSV, nirsevimab, PCV 20, Rotavirrus, Tdap, Shingrix), or gelatin (GRZEGORZ, MMR)?     Do you have an anaphylactic allergy to neomycin (Rabies, GRZEGORZ, MMR, IPV, Hep A), polymyxin B (IPV), or streptomycin (IPV)?      Any cancer, leukemia, AIDS, or other immune system disorder? (GRZEGORZ, MMR, RV)     Do you have a parent, brother, or sister with an immune system problem (if immune competence of vaccine recipient clinically verified, can proceed)? (MMR, GRZEGORZ)     Any recent steroid treatments for >2 weeks, chemotherapy, or radiation treatment? (GRZEGORZ, MMR)     Have you received antibody-containing blood transfusions or IVIG in the past 11 months (recommended interval is dependent on product)? (MMR, GRZEGORZ)     Have you taken antiviral drugs (acyclovir, famciclovir, valacyclovir for GRZEGORZ) in the last 24 or 48 hours, respectively?      Are you pregnant or planning to become  "pregnant within 1 month? (GRZEGORZ, MMR, HPV, IPV, MenB, Abrexvy; For Hep B- refer to Engerix-B; For RSV - Abrysvo is indicated for 32-36 weeks of pregnancy from September to January)     For infants, have you ever been told your child has had intussusception or a medical emergency involving obstruction of the intestine (Rotavirus)? If not for an infant, can skip this question.         *Ordering Physicians/APC should be consulted if \"yes\" is checked by the patient or guardian above.  I have received, read, and understand the Vaccine Information Statement (VIS) for each vaccine ordered.  I have considered my or my child's health status as well as the health status of my close contacts.  I have taken the opportunity to discuss my vaccine questions with my or my child's health care provider.   I have requested that the ordered vaccine(s) be given to me or my child.  I understand the benefits and risks of the vaccines.  I understand that I should remain in the clinic for 15 minutes after receiving the vaccine(s).  _________________________________________________________  Signature of Patient or Parent/Legal Guardian ____________________  Date     "

## 2025-04-13 DIAGNOSIS — I10 PRIMARY HYPERTENSION: ICD-10-CM

## 2025-04-13 DIAGNOSIS — R93.89 THICKENED ENDOMETRIUM: ICD-10-CM

## 2025-04-13 DIAGNOSIS — E03.9 ACQUIRED HYPOTHYROIDISM: ICD-10-CM

## 2025-04-14 RX ORDER — THYROID 30 MG/1
30 TABLET ORAL DAILY
Qty: 90 TABLET | Refills: 1 | Status: SHIPPED | OUTPATIENT
Start: 2025-04-14

## 2025-04-14 RX ORDER — MELOXICAM 15 MG/1
15 TABLET ORAL DAILY
Qty: 90 TABLET | Refills: 1 | Status: SHIPPED | OUTPATIENT
Start: 2025-04-14

## 2025-04-14 RX ORDER — ALBUTEROL SULFATE 90 UG/1
1 INHALANT RESPIRATORY (INHALATION) EVERY 4 HOURS PRN
Qty: 18 G | Refills: 1 | Status: SHIPPED | OUTPATIENT
Start: 2025-04-14

## 2025-04-14 RX ORDER — ASPIRIN 81 MG/1
81 TABLET, CHEWABLE ORAL DAILY
Qty: 90 TABLET | Refills: 3 | Status: SHIPPED | OUTPATIENT
Start: 2025-04-14

## 2025-04-14 RX ORDER — PANTOPRAZOLE SODIUM 40 MG/1
40 TABLET, DELAYED RELEASE ORAL 2 TIMES DAILY
Qty: 180 TABLET | Refills: 1 | Status: SHIPPED | OUTPATIENT
Start: 2025-04-14

## 2025-04-14 RX ORDER — ATENOLOL 25 MG/1
12.5 TABLET ORAL 2 TIMES DAILY
Qty: 90 TABLET | Refills: 1 | Status: SHIPPED | OUTPATIENT
Start: 2025-04-14

## 2025-04-14 RX ORDER — DULOXETIN HYDROCHLORIDE 60 MG/1
60 CAPSULE, DELAYED RELEASE ORAL DAILY
Qty: 90 CAPSULE | Refills: 1 | Status: SHIPPED | OUTPATIENT
Start: 2025-04-14

## 2025-04-14 RX ORDER — ROSUVASTATIN CALCIUM 40 MG/1
40 TABLET, COATED ORAL NIGHTLY
Qty: 90 TABLET | Refills: 3 | Status: SHIPPED | OUTPATIENT
Start: 2025-04-14

## (undated) DEVICE — 1000ML,PRESSURE INFUSER W/STOPCOCK: Brand: MEDLINE

## (undated) DEVICE — SLV SCD KN/LEN ADJ EXPRSS BLENDED MD 1P/U

## (undated) DEVICE — SOL NACL 0.9PCT 1000ML

## (undated) DEVICE — CATH URETH INTRMIT ALLPURP LTX 16F RED

## (undated) DEVICE — GLOVE,SURG,SENSICARE SLT,LF,PF,6.5: Brand: MEDLINE

## (undated) DEVICE — GLV SURG SENSICARE PI PF LF 7 GRN STRL

## (undated) DEVICE — LITHOTOMY-SLINGS: Brand: MEDLINE INDUSTRIES, INC.

## (undated) DEVICE — SKIN PREP TRAY W/CHG: Brand: MEDLINE INDUSTRIES, INC.